# Patient Record
Sex: FEMALE | Race: WHITE | NOT HISPANIC OR LATINO | Employment: UNEMPLOYED | URBAN - METROPOLITAN AREA
[De-identification: names, ages, dates, MRNs, and addresses within clinical notes are randomized per-mention and may not be internally consistent; named-entity substitution may affect disease eponyms.]

---

## 2019-03-18 ENCOUNTER — TRANSCRIBE ORDERS (OUTPATIENT)
Dept: ADMINISTRATIVE | Facility: HOSPITAL | Age: 34
End: 2019-03-18

## 2019-03-18 DIAGNOSIS — R22.41 MASS OF RIGHT KNEE: Primary | ICD-10-CM

## 2019-03-22 ENCOUNTER — HOSPITAL ENCOUNTER (OUTPATIENT)
Dept: ULTRASOUND IMAGING | Facility: MEDICAL CENTER | Age: 34
Discharge: HOME | End: 2019-03-22
Payer: COMMERCIAL

## 2019-03-22 DIAGNOSIS — R22.41 MASS OF RIGHT KNEE: ICD-10-CM

## 2019-03-22 PROCEDURE — 76882 US LMTD JT/FCL EVL NVASC XTR: CPT

## 2019-05-15 ENCOUNTER — TRANSCRIBE ORDERS (OUTPATIENT)
Dept: ADMINISTRATIVE | Facility: HOSPITAL | Age: 34
End: 2019-05-15

## 2019-05-15 DIAGNOSIS — M25.861 CYST OF RIGHT KNEE JOINT: Primary | ICD-10-CM

## 2019-06-12 ENCOUNTER — HOSPITAL ENCOUNTER (OUTPATIENT)
Dept: MRI IMAGING | Facility: HOSPITAL | Age: 34
Discharge: HOME | End: 2019-06-12
Payer: COMMERCIAL

## 2019-06-12 DIAGNOSIS — M25.861 CYST OF RIGHT KNEE JOINT: ICD-10-CM

## 2019-06-12 PROCEDURE — 73721 MRI JNT OF LWR EXTRE W/O DYE: CPT

## 2019-12-27 ENCOUNTER — TRANSCRIBE ORDERS (OUTPATIENT)
Dept: ADMINISTRATIVE | Facility: HOSPITAL | Age: 34
End: 2019-12-27

## 2020-09-23 PROCEDURE — 87653 STREP B DNA AMP PROBE: CPT | Performed by: OBSTETRICS & GYNECOLOGY

## 2020-09-24 ENCOUNTER — LAB REQUISITION (OUTPATIENT)
Dept: LAB | Facility: HOSPITAL | Age: 35
End: 2020-09-24
Payer: COMMERCIAL

## 2020-09-24 DIAGNOSIS — O09.893 SUPERVISION OF OTHER HIGH RISK PREGNANCIES, THIRD TRIMESTER: ICD-10-CM

## 2020-09-26 LAB — GP B STREP DNA SPEC QL NAA+PROBE: ABNORMAL

## 2020-09-27 ENCOUNTER — HOSPITAL ENCOUNTER (OUTPATIENT)
Facility: HOSPITAL | Age: 35
Discharge: HOME | End: 2020-09-27
Attending: OBSTETRICS & GYNECOLOGY | Admitting: OBSTETRICS & GYNECOLOGY
Payer: COMMERCIAL

## 2020-09-27 VITALS
HEART RATE: 80 BPM | RESPIRATION RATE: 18 BRPM | DIASTOLIC BLOOD PRESSURE: 58 MMHG | SYSTOLIC BLOOD PRESSURE: 116 MMHG | TEMPERATURE: 98.5 F

## 2020-09-27 PROBLEM — Z3A.37 37 WEEKS GESTATION OF PREGNANCY: Status: ACTIVE | Noted: 2020-09-27

## 2020-09-27 PROBLEM — Z87.59 HISTORY OF DELIVERY OF MACROSOMAL INFANT: Status: ACTIVE | Noted: 2020-09-27

## 2020-09-27 PROBLEM — B95.1 POSITIVE GBS TEST: Status: ACTIVE | Noted: 2020-09-27

## 2020-09-27 PROCEDURE — 99203 OFFICE O/P NEW LOW 30 MIN: CPT

## 2020-09-27 PROCEDURE — NC001 PR NO CHARGE: Performed by: OBSTETRICS & GYNECOLOGY

## 2020-10-22 ENCOUNTER — ANESTHESIA EVENT (INPATIENT)
Dept: ANESTHESIOLOGY | Facility: HOSPITAL | Age: 35
End: 2020-10-22
Payer: COMMERCIAL

## 2020-10-22 ENCOUNTER — ANESTHESIA (INPATIENT)
Dept: ANESTHESIOLOGY | Facility: HOSPITAL | Age: 35
End: 2020-10-22
Payer: COMMERCIAL

## 2020-10-22 ENCOUNTER — HOSPITAL ENCOUNTER (INPATIENT)
Facility: HOSPITAL | Age: 35
LOS: 1 days | Discharge: HOME | End: 2020-10-23
Attending: OBSTETRICS & GYNECOLOGY | Admitting: OBSTETRICS & GYNECOLOGY
Payer: COMMERCIAL

## 2020-10-22 DIAGNOSIS — Z3A.40 40 WEEKS GESTATION OF PREGNANCY: Primary | ICD-10-CM

## 2020-10-22 PROBLEM — F41.9 ANXIETY: Status: ACTIVE | Noted: 2020-10-22

## 2020-10-22 LAB
ABO GROUP BLD: NORMAL
BASE EXCESS BLDCOV CALC-SCNC: -2.9 MMOL/L (ref 1–9)
BASOPHILS # BLD AUTO: 0.02 THOUSANDS/ΜL (ref 0–0.1)
BASOPHILS NFR BLD AUTO: 0 % (ref 0–1)
BLD GP AB SCN SERPL QL: NEGATIVE
EOSINOPHIL # BLD AUTO: 0.2 THOUSAND/ΜL (ref 0–0.61)
EOSINOPHIL NFR BLD AUTO: 2 % (ref 0–6)
ERYTHROCYTE [DISTWIDTH] IN BLOOD BY AUTOMATED COUNT: 13 % (ref 11.6–15.1)
HCO3 BLDCOV-SCNC: 21.7 MMOL/L (ref 12.2–28.6)
HCT VFR BLD AUTO: 38.5 % (ref 34.8–46.1)
HGB BLD-MCNC: 13 G/DL (ref 11.5–15.4)
IMM GRANULOCYTES # BLD AUTO: 0.03 THOUSAND/UL (ref 0–0.2)
IMM GRANULOCYTES NFR BLD AUTO: 0 % (ref 0–2)
LYMPHOCYTES # BLD AUTO: 2.71 THOUSANDS/ΜL (ref 0.6–4.47)
LYMPHOCYTES NFR BLD AUTO: 25 % (ref 14–44)
MCH RBC QN AUTO: 31.6 PG (ref 26.8–34.3)
MCHC RBC AUTO-ENTMCNC: 33.8 G/DL (ref 31.4–37.4)
MCV RBC AUTO: 94 FL (ref 82–98)
MONOCYTES # BLD AUTO: 1.09 THOUSAND/ΜL (ref 0.17–1.22)
MONOCYTES NFR BLD AUTO: 10 % (ref 4–12)
NEUTROPHILS # BLD AUTO: 6.77 THOUSANDS/ΜL (ref 1.85–7.62)
NEUTS SEG NFR BLD AUTO: 63 % (ref 43–75)
NRBC BLD AUTO-RTO: 0 /100 WBCS
OXYHGB MFR BLDCOV: 71.7 %
PCO2 BLDCOV: 37.7 MM HG (ref 27–43)
PH BLDCOV: 7.38 [PH] (ref 7.19–7.49)
PLATELET # BLD AUTO: 174 THOUSANDS/UL (ref 149–390)
PMV BLD AUTO: 10 FL (ref 8.9–12.7)
PO2 BLDCOV: 30.9 MM HG (ref 15–45)
RBC # BLD AUTO: 4.11 MILLION/UL (ref 3.81–5.12)
RH BLD: POSITIVE
RPR SER QL: NORMAL
SAO2 % BLDCOV: 15.9 ML/DL
SPECIMEN EXPIRATION DATE: NORMAL
WBC # BLD AUTO: 10.82 THOUSAND/UL (ref 4.31–10.16)

## 2020-10-22 PROCEDURE — 99213 OFFICE O/P EST LOW 20 MIN: CPT

## 2020-10-22 PROCEDURE — NC001 PR NO CHARGE: Performed by: OBSTETRICS & GYNECOLOGY

## 2020-10-22 PROCEDURE — 86900 BLOOD TYPING SEROLOGIC ABO: CPT | Performed by: OBSTETRICS & GYNECOLOGY

## 2020-10-22 PROCEDURE — 86901 BLOOD TYPING SEROLOGIC RH(D): CPT | Performed by: OBSTETRICS & GYNECOLOGY

## 2020-10-22 PROCEDURE — 82805 BLOOD GASES W/O2 SATURATION: CPT | Performed by: OBSTETRICS & GYNECOLOGY

## 2020-10-22 PROCEDURE — 86850 RBC ANTIBODY SCREEN: CPT | Performed by: OBSTETRICS & GYNECOLOGY

## 2020-10-22 PROCEDURE — 85025 COMPLETE CBC W/AUTO DIFF WBC: CPT | Performed by: OBSTETRICS & GYNECOLOGY

## 2020-10-22 PROCEDURE — 4A1HXCZ MONITORING OF PRODUCTS OF CONCEPTION, CARDIAC RATE, EXTERNAL APPROACH: ICD-10-PCS | Performed by: OBSTETRICS & GYNECOLOGY

## 2020-10-22 PROCEDURE — 86592 SYPHILIS TEST NON-TREP QUAL: CPT | Performed by: OBSTETRICS & GYNECOLOGY

## 2020-10-22 RX ORDER — LORAZEPAM 2 MG/ML
0.5 INJECTION INTRAMUSCULAR ONCE
Status: COMPLETED | OUTPATIENT
Start: 2020-10-22 | End: 2020-10-22

## 2020-10-22 RX ORDER — OXYCODONE HYDROCHLORIDE AND ACETAMINOPHEN 5; 325 MG/1; MG/1
2 TABLET ORAL EVERY 4 HOURS PRN
Status: DISCONTINUED | OUTPATIENT
Start: 2020-10-22 | End: 2020-10-24 | Stop reason: HOSPADM

## 2020-10-22 RX ORDER — OXYCODONE HYDROCHLORIDE AND ACETAMINOPHEN 5; 325 MG/1; MG/1
1 TABLET ORAL EVERY 4 HOURS PRN
Status: DISCONTINUED | OUTPATIENT
Start: 2020-10-22 | End: 2020-10-24 | Stop reason: HOSPADM

## 2020-10-22 RX ORDER — IBUPROFEN 600 MG/1
600 TABLET ORAL EVERY 6 HOURS PRN
Status: DISCONTINUED | OUTPATIENT
Start: 2020-10-22 | End: 2020-10-24 | Stop reason: HOSPADM

## 2020-10-22 RX ORDER — SODIUM CHLORIDE, SODIUM LACTATE, POTASSIUM CHLORIDE, CALCIUM CHLORIDE 600; 310; 30; 20 MG/100ML; MG/100ML; MG/100ML; MG/100ML
125 INJECTION, SOLUTION INTRAVENOUS CONTINUOUS
Status: DISCONTINUED | OUTPATIENT
Start: 2020-10-22 | End: 2020-10-22

## 2020-10-22 RX ORDER — OXYTOCIN/RINGER'S LACTATE 30/500 ML
250 PLASTIC BAG, INJECTION (ML) INTRAVENOUS CONTINUOUS
Status: DISCONTINUED | OUTPATIENT
Start: 2020-10-22 | End: 2020-10-24 | Stop reason: HOSPADM

## 2020-10-22 RX ORDER — OXYTOCIN/RINGER'S LACTATE 30/500 ML
PLASTIC BAG, INJECTION (ML) INTRAVENOUS
Status: DISPENSED
Start: 2020-10-22 | End: 2020-10-22

## 2020-10-22 RX ORDER — CALCIUM CARBONATE 200(500)MG
1000 TABLET,CHEWABLE ORAL DAILY PRN
Status: DISCONTINUED | OUTPATIENT
Start: 2020-10-22 | End: 2020-10-24 | Stop reason: HOSPADM

## 2020-10-22 RX ORDER — ACETAMINOPHEN 325 MG/1
650 TABLET ORAL EVERY 6 HOURS PRN
Status: DISCONTINUED | OUTPATIENT
Start: 2020-10-22 | End: 2020-10-24 | Stop reason: HOSPADM

## 2020-10-22 RX ORDER — ONDANSETRON 2 MG/ML
4 INJECTION INTRAMUSCULAR; INTRAVENOUS EVERY 8 HOURS PRN
Status: DISCONTINUED | OUTPATIENT
Start: 2020-10-22 | End: 2020-10-24 | Stop reason: HOSPADM

## 2020-10-22 RX ORDER — DIAPER,BRIEF,INFANT-TODD,DISP
1 EACH MISCELLANEOUS 4 TIMES DAILY PRN
Status: DISCONTINUED | OUTPATIENT
Start: 2020-10-22 | End: 2020-10-24 | Stop reason: HOSPADM

## 2020-10-22 RX ORDER — DIPHENHYDRAMINE HCL 25 MG
25 TABLET ORAL EVERY 6 HOURS PRN
Status: DISCONTINUED | OUTPATIENT
Start: 2020-10-22 | End: 2020-10-24 | Stop reason: HOSPADM

## 2020-10-22 RX ORDER — DOCUSATE SODIUM 100 MG/1
100 CAPSULE, LIQUID FILLED ORAL 2 TIMES DAILY
Status: DISCONTINUED | OUTPATIENT
Start: 2020-10-22 | End: 2020-10-24 | Stop reason: HOSPADM

## 2020-10-22 RX ORDER — ONDANSETRON 2 MG/ML
4 INJECTION INTRAMUSCULAR; INTRAVENOUS EVERY 6 HOURS PRN
Status: DISCONTINUED | OUTPATIENT
Start: 2020-10-22 | End: 2020-10-22

## 2020-10-22 RX ADMIN — SERTRALINE HYDROCHLORIDE 50 MG: 50 TABLET, FILM COATED ORAL at 21:58

## 2020-10-22 RX ADMIN — WITCH HAZEL 1 PAD: 500 SOLUTION RECTAL; TOPICAL at 08:39

## 2020-10-22 RX ADMIN — SODIUM CHLORIDE 5 MILLION UNITS: 0.9 INJECTION, SOLUTION INTRAVENOUS at 03:02

## 2020-10-22 RX ADMIN — DOCUSATE SODIUM 100 MG: 100 CAPSULE, LIQUID FILLED ORAL at 17:46

## 2020-10-22 RX ADMIN — IBUPROFEN 600 MG: 600 TABLET, FILM COATED ORAL at 17:46

## 2020-10-22 RX ADMIN — HYDROCORTISONE 1 APPLICATION: 1 CREAM TOPICAL at 08:39

## 2020-10-22 RX ADMIN — BENZOCAINE AND LEVOMENTHOL: 200; 5 SPRAY TOPICAL at 08:39

## 2020-10-22 RX ADMIN — LORAZEPAM 0.5 MG: 2 INJECTION INTRAMUSCULAR; INTRAVENOUS at 05:10

## 2020-10-22 RX ADMIN — DOCUSATE SODIUM 100 MG: 100 CAPSULE, LIQUID FILLED ORAL at 08:39

## 2020-10-23 VITALS
DIASTOLIC BLOOD PRESSURE: 66 MMHG | HEIGHT: 68 IN | BODY MASS INDEX: 42.44 KG/M2 | WEIGHT: 280 LBS | TEMPERATURE: 98.1 F | RESPIRATION RATE: 17 BRPM | SYSTOLIC BLOOD PRESSURE: 102 MMHG | HEART RATE: 66 BPM

## 2020-10-23 PROCEDURE — NC001 PR NO CHARGE: Performed by: OBSTETRICS & GYNECOLOGY

## 2020-10-23 RX ORDER — ACETAMINOPHEN 325 MG/1
650 TABLET ORAL EVERY 6 HOURS PRN
Refills: 0
Start: 2020-10-23 | End: 2022-04-08 | Stop reason: ALTCHOICE

## 2020-10-23 RX ORDER — IBUPROFEN 200 MG
600 TABLET ORAL EVERY 6 HOURS PRN
Start: 2020-10-23 | End: 2022-04-08 | Stop reason: ALTCHOICE

## 2020-10-23 RX ADMIN — DOCUSATE SODIUM 100 MG: 100 CAPSULE, LIQUID FILLED ORAL at 13:01

## 2020-10-23 RX ADMIN — IBUPROFEN 600 MG: 600 TABLET, FILM COATED ORAL at 02:36

## 2020-10-30 LAB — PLACENTA IN STORAGE: NORMAL

## 2021-10-18 ENCOUNTER — OFFICE VISIT (OUTPATIENT)
Dept: URGENT CARE | Facility: CLINIC | Age: 36
End: 2021-10-18
Payer: COMMERCIAL

## 2021-10-18 VITALS
OXYGEN SATURATION: 98 % | HEART RATE: 82 BPM | SYSTOLIC BLOOD PRESSURE: 127 MMHG | TEMPERATURE: 97.8 F | RESPIRATION RATE: 20 BRPM | DIASTOLIC BLOOD PRESSURE: 77 MMHG

## 2021-10-18 DIAGNOSIS — J06.9 ACUTE URI: ICD-10-CM

## 2021-10-18 DIAGNOSIS — J02.9 SORE THROAT: Primary | ICD-10-CM

## 2021-10-18 LAB — S PYO AG THROAT QL: NEGATIVE

## 2021-10-18 PROCEDURE — 87070 CULTURE OTHR SPECIMN AEROBIC: CPT | Performed by: NURSE PRACTITIONER

## 2021-10-18 PROCEDURE — U0005 INFEC AGEN DETEC AMPLI PROBE: HCPCS | Performed by: NURSE PRACTITIONER

## 2021-10-18 PROCEDURE — U0003 INFECTIOUS AGENT DETECTION BY NUCLEIC ACID (DNA OR RNA); SEVERE ACUTE RESPIRATORY SYNDROME CORONAVIRUS 2 (SARS-COV-2) (CORONAVIRUS DISEASE [COVID-19]), AMPLIFIED PROBE TECHNIQUE, MAKING USE OF HIGH THROUGHPUT TECHNOLOGIES AS DESCRIBED BY CMS-2020-01-R: HCPCS | Performed by: NURSE PRACTITIONER

## 2021-10-18 PROCEDURE — 99213 OFFICE O/P EST LOW 20 MIN: CPT | Performed by: NURSE PRACTITIONER

## 2021-10-18 PROCEDURE — 87147 CULTURE TYPE IMMUNOLOGIC: CPT | Performed by: NURSE PRACTITIONER

## 2021-10-20 LAB — SARS-COV-2 RNA RESP QL NAA+PROBE: POSITIVE

## 2021-10-22 LAB — BACTERIA THROAT CULT: ABNORMAL

## 2022-04-08 ENCOUNTER — OFFICE VISIT (OUTPATIENT)
Dept: FAMILY MEDICINE CLINIC | Facility: CLINIC | Age: 37
End: 2022-04-08
Payer: COMMERCIAL

## 2022-04-08 VITALS
WEIGHT: 262.2 LBS | HEART RATE: 70 BPM | SYSTOLIC BLOOD PRESSURE: 120 MMHG | BODY MASS INDEX: 38.83 KG/M2 | HEIGHT: 69 IN | DIASTOLIC BLOOD PRESSURE: 78 MMHG | RESPIRATION RATE: 15 BRPM | OXYGEN SATURATION: 98 %

## 2022-04-08 DIAGNOSIS — F41.9 ANXIETY AND DEPRESSION: ICD-10-CM

## 2022-04-08 DIAGNOSIS — Z00.00 ANNUAL PHYSICAL EXAM: Primary | ICD-10-CM

## 2022-04-08 DIAGNOSIS — R53.83 OTHER FATIGUE: ICD-10-CM

## 2022-04-08 DIAGNOSIS — F32.A ANXIETY AND DEPRESSION: ICD-10-CM

## 2022-04-08 PROBLEM — Z3A.40 40 WEEKS GESTATION OF PREGNANCY: Status: RESOLVED | Noted: 2020-09-27 | Resolved: 2022-04-08

## 2022-04-08 PROBLEM — D48.1 NEOPLASM OF UNCERTAIN BEHAVIOR OF CONNECTIVE AND OTHER SOFT TISSUE: Status: ACTIVE | Noted: 2019-09-20

## 2022-04-08 PROBLEM — D48.19 NEOPLASM OF UNCERTAIN BEHAVIOR OF CONNECTIVE AND OTHER SOFT TISSUE: Status: ACTIVE | Noted: 2019-09-20

## 2022-04-08 PROBLEM — Z87.59 HISTORY OF DELIVERY OF MACROSOMAL INFANT: Status: RESOLVED | Noted: 2020-09-27 | Resolved: 2022-04-08

## 2022-04-08 PROCEDURE — 1036F TOBACCO NON-USER: CPT | Performed by: NURSE PRACTITIONER

## 2022-04-08 PROCEDURE — 99385 PREV VISIT NEW AGE 18-39: CPT | Performed by: NURSE PRACTITIONER

## 2022-04-08 PROCEDURE — 3008F BODY MASS INDEX DOCD: CPT | Performed by: NURSE PRACTITIONER

## 2022-04-08 PROCEDURE — 99204 OFFICE O/P NEW MOD 45 MIN: CPT | Performed by: NURSE PRACTITIONER

## 2022-04-08 PROCEDURE — 3725F SCREEN DEPRESSION PERFORMED: CPT | Performed by: NURSE PRACTITIONER

## 2022-04-08 RX ORDER — SERTRALINE HYDROCHLORIDE 100 MG/1
100 TABLET, FILM COATED ORAL DAILY
Qty: 90 TABLET | Refills: 3 | Status: SHIPPED | OUTPATIENT
Start: 2022-04-08

## 2022-04-08 NOTE — PATIENT INSTRUCTIONS
0 = independent Lower Back Exercises   WHAT YOU NEED TO KNOW:   Lower back exercises help heal and strengthen your back muscles to prevent another injury  Ask your healthcare provider if you need to see a physical therapist for more advanced exercises  DISCHARGE INSTRUCTIONS:   Return to the emergency department if:   · You have severe pain that prevents you from moving  Contact your healthcare provider if:   · Your pain becomes worse  · You have new pain  · You have questions or concerns about your condition or care  Do lower back exercises safely:   · Do the exercises on a mat or firm surface  (not on a bed) to support your spine and prevent low back pain  · Move slowly and smoothly  Avoid fast or jerky motions  · Breathe normally  Do not hold your breath  · Stop if you feel pain  It is normal to feel some discomfort at first  Regular exercise will help decrease your discomfort over time  Lower back exercises: Your healthcare provider may recommend that you do back exercises 10 to 30 minutes each day  He may also recommend that you do exercises 1 to 3 times each day  Ask your healthcare provider which exercises are best for you and how often to do them  · Ankle pumps:  Lie on your back  Move your foot up (with your toes pointing toward your head)  Then, move your foot down (with your toes pointing away from you)  Repeat this exercise 10 times on each side  · Heel slides:  Lie on your back  Slowly bend one leg and then straighten it  Next, bend the other leg and then straighten it  Repeat 10 times on each side  · Pelvic tilt:  Lie on your back with your knees bent and feet flat on the floor  Place your arms in a relaxed position beside your body  Tighten the muscles of your abdomen and flatten your back against the floor  Hold for 5 seconds  Repeat 5 times  · Back stretch:  Lie on your back with your hands behind your head   Bend your knees and turn the lower half of your body to one side  Hold this position for 10 seconds  Repeat 3 times on each side  · Straight leg raises:  Lie on your back with one leg straight  Bend the other knee  Tighten your abdomen and then slowly lift the straight leg up about 6 to 12 inches off the floor  Hold for 1 to 5 seconds  Lower your leg slowly  Repeat 10 times on each leg  · Knee-to-chest:  Lie on your back with your knees bent and feet flat on the floor  Pull one of your knees toward your chest and hold it there for 5 seconds  Return your leg to the starting position  Lift the other knee toward your chest and hold for 5 seconds  Do this 5 times on each side  · Cat and camel:  Place your hands and knees on the floor  Arch your back upward toward the ceiling and lower your head  Round out your spine as much as you can  Hold for 5 seconds  Lift your head upward and push your chest downward toward the floor  Hold for 5 seconds  Do 3 sets or as directed  · Wall squats:  Stand with your back against a wall  Tighten the muscles of your abdomen  Slowly lower your body until your knees are bent at a 45 degree angle  Hold this position for 5 seconds  Slowly move back up to a standing position  Repeat 10 times  · Curl up:  Lie on your back with your knees bent and feet flat on the floor  Place your hands, palms down, underneath the curve in your lower back  Next, with your elbows on the floor, lift your shoulders and chest 2 to 3 inches  Keep your head in line with your shoulders  Hold this position for 5 seconds  When you can do this exercise without pain for 10 to 15 seconds, you may add a rotation  While your shoulders and chest are lifted off the ground, turn slightly to the left and hold  Repeat on the other side  · Bird dog:  Place your hands and knees on the floor  Keep your wrists directly below your shoulders and your knees directly below your hips  Pull your belly button in toward your spine   Do not flatten or arch your back  Tighten your abdominal muscles  Raise one arm straight out so that it is aligned with your head  Next, raise the leg opposite your arm  Hold this position for 15 seconds  Lower your arm and leg slowly and change sides  Do 5 sets  © Copyright [employer] 2022 Information is for End User's use only and may not be sold, redistributed or otherwise used for commercial purposes  All illustrations and images included in CareNotes® are the copyrighted property of A D A M , Inc  or Shayla Gaytan   The above information is an  only  It is not intended as medical advice for individual conditions or treatments  Talk to your doctor, nurse or pharmacist before following any medical regimen to see if it is safe and effective for you  Wellness Visit for Adults   AMBULATORY CARE:   A wellness visit  is when you see your healthcare provider to get screened for health problems  Your healthcare provider will also give you advice on how to stay healthy  Write down your questions so you remember to ask them  Ask your healthcare provider how often you should have a wellness visit  What happens at a wellness visit:  Your healthcare provider will ask about your health, and your family history of health problems  This includes high blood pressure, heart disease, and cancer  He or she will ask if you have symptoms that concern you, if you smoke, and about your mood  You may also be asked about your intake of medicines, supplements, food, and alcohol  Any of the following may be done:  · Your weight  will be checked  Your height may also be checked so your body mass index (BMI) can be calculated  Your BMI shows if you are at a healthy weight  · Your blood pressure  and heart rate will be checked  Your temperature may also be checked  · Blood and urine tests  may be done  Blood tests may be done to check your cholesterol levels   Abnormal cholesterol levels increase your risk for heart disease and stroke  You may also need a blood or urine test to check for diabetes if you are at increased risk  Urine tests may be done to look for signs of an infection or kidney disease  · A physical exam  includes checking your heartbeat and lungs with a stethoscope  Your healthcare provider may also check your skin to look for sun damage  · Screening tests  may be recommended  A screening test is done to check for diseases that may not cause symptoms  The screening tests you may need depend on your age, gender, family history, and lifestyle habits  For example, colorectal screening may be recommended if you are 48years old or older  Screening tests you need if you are a woman:   · A Pap smear  is used to screen for cervical cancer  Pap smears are usually done every 3 to 5 years depending on your age  You may need them more often if you have had abnormal Pap smear test results in the past  Ask your healthcare provider how often you should have a Pap smear  · A mammogram  is an x-ray of your breasts to screen for breast cancer  Experts recommend mammograms every 2 years starting at age 48 years  You may need a mammogram at age 52 years or younger if you have an increased risk for breast cancer  Talk to your healthcare provider about when you should start having mammograms and how often you need them  Vaccines you may need:   · Get an influenza vaccine  every year  The influenza vaccine protects you from the flu  Several types of viruses cause the flu  The viruses change over time, so new vaccines are made each year  · Get a tetanus-diphtheria (Td) booster vaccine  every 10 years  This vaccine protects you against tetanus and diphtheria  Tetanus is a severe infection that may cause painful muscle spasms and lockjaw  Diphtheria is a severe bacterial infection that causes a thick covering in the back of your mouth and throat      · Get a human papillomavirus (HPV) vaccine  if you are female and aged 23 to 32 or male 23 to 24 and never received it  This vaccine protects you from HPV infection  HPV is the most common infection spread by sexual contact  HPV may also cause vaginal, penile, and anal cancers  · Get a pneumococcal vaccine  if you are aged 72 years or older  The pneumococcal vaccine is an injection given to protect you from pneumococcal disease  Pneumococcal disease is an infection caused by pneumococcal bacteria  The infection may cause pneumonia, meningitis, or an ear infection  · Get a shingles vaccine  if you are 60 or older, even if you have had shingles before  The shingles vaccine is an injection to protect you from the varicella-zoster virus  This is the same virus that causes chickenpox  Shingles is a painful rash that develops in people who had chickenpox or have been exposed to the virus  How to eat healthy:  My Plate is a model for planning healthy meals  It shows the types and amounts of foods that should go on your plate  Fruits and vegetables make up about half of your plate, and grains and protein make up the other half  A serving of dairy is included on the side of your plate  The amount of calories and serving sizes you need depends on your age, gender, weight, and height  Examples of healthy foods are listed below:  · Eat a variety of vegetables  such as dark green, red, and orange vegetables  You can also include canned vegetables low in sodium (salt) and frozen vegetables without added butter or sauces  · Eat a variety of fresh fruits , canned fruit in 100% juice, frozen fruit, and dried fruit  · Include whole grains  At least half of the grains you eat should be whole grains  Examples include whole-wheat bread, wheat pasta, brown rice, and whole-grain cereals such as oatmeal     · Eat a variety of protein foods such as seafood (fish and shellfish), lean meat, and poultry without skin (turkey and chicken)   Examples of lean meats include pork leg, shoulder, or tenderloin, and beef round, sirloin, tenderloin, and extra lean ground beef  Other protein foods include eggs and egg substitutes, beans, peas, soy products, nuts, and seeds  · Choose low-fat dairy products such as skim or 1% milk or low-fat yogurt, cheese, and cottage cheese  · Limit unhealthy fats  such as butter, hard margarine, and shortening  Exercise:  Exercise at least 30 minutes per day on most days of the week  Some examples of exercise include walking, biking, dancing, and swimming  You can also fit in more physical activity by taking the stairs instead of the elevator or parking farther away from stores  Include muscle strengthening activities 2 days each week  Regular exercise provides many health benefits  It helps you manage your weight, and decreases your risk for type 2 diabetes, heart disease, stroke, and high blood pressure  Exercise can also help improve your mood  Ask your healthcare provider about the best exercise plan for you  General health and safety guidelines:   · Do not smoke  Nicotine and other chemicals in cigarettes and cigars can cause lung damage  Ask your healthcare provider for information if you currently smoke and need help to quit  E-cigarettes or smokeless tobacco still contain nicotine  Talk to your healthcare provider before you use these products  · Limit alcohol  A drink of alcohol is 12 ounces of beer, 5 ounces of wine, or 1½ ounces of liquor  · Lose weight, if needed  Being overweight increases your risk of certain health conditions  These include heart disease, high blood pressure, type 2 diabetes, and certain types of cancer  · Protect your skin  Do not sunbathe or use tanning beds  Use sunscreen with a SPF 15 or higher  Apply sunscreen at least 15 minutes before you go outside  Reapply sunscreen every 2 hours  Wear protective clothing, hats, and sunglasses when you are outside  · Drive safely  Always wear your seatbelt   Make sure everyone in your car wears a seatbelt  A seatbelt can save your life if you are in an accident  Do not use your cell phone when you are driving  This could distract you and cause an accident  Pull over if you need to make a call or send a text message  · Practice safe sex  Use latex condoms if are sexually active and have more than one partner  Your healthcare provider may recommend screening tests for sexually transmitted infections (STIs)  · Wear helmets, lifejackets, and protective gear  Always wear a helmet when you ride a bike or motorcycle, go skiing, or play sports that could cause a head injury  Wear protective equipment when you play sports  Wear a lifejacket when you are on a boat or doing water sports  © Copyright [employer] 2022 Information is for End User's use only and may not be sold, redistributed or otherwise used for commercial purposes  All illustrations and images included in CareNotes® are the copyrighted property of A D A M , Inc  or [facility] Steve Gaytan   The above information is an  only  It is not intended as medical advice for individual conditions or treatments  Talk to your doctor, nurse or pharmacist before following any medical regimen to see if it is safe and effective for you

## 2022-04-08 NOTE — PROGRESS NOTES
Assessment/Plan:     Diagnoses and all orders for this visit:    Anxiety and depression  -     sertraline (ZOLOFT) 100 mg tablet; Take 1 tablet (100 mg total) by mouth daily    Stable at this time per pt  Continue Zoloft  Patient is encouraged to call our office for any questions/concerns, persistent or worsening symptoms  Patient states they understand and agree with treatment plan  Other fatigue  -     CBC and differential; Future  -     Comprehensive metabolic panel; Future  -     TSH, 3rd generation with Free T4 reflex; Future  -     Vitamin D 25 hydroxy; Future  -     Lyme Total Antibody Profile with reflex to WB; Future    Etiology unclear at this time  Baseline labs to further evaluate  Hx of covid fall 2021  We will contact her with results once available  Patient is encouraged to call our office for any questions/concerns, persistent or worsening symptoms  Patient states they understand and agree with treatment plan  BMI 39 0-39 9,adult  -     Ambulatory Referral to Weight Management; Future      We discussed appropriate lifestyle recommendations & goal BMI range  Pt was educated on appropriate diet and exercise modifications  Weight mgmt referral placed  Pt to f/u PRN  F/u pending results  Subjective:      Patient ID: Lito Gonzales is a 39 y o  female  New patient presents to establish care  She notes a hx of depression which she is on Zoloft 100 mg for  She also notes that she was seeing a therapist prior, but stopped in December d/t cost   Patient not interested in starting again for the time being  She does have concerns over fatigue and inability to lose weight  She notes she had covid fall of 2021  The fatigue is every day and she notes she feels tired all the time  Patient also has an 21 month old  Additionally, she would like to see weight mgmt        The following portions of the patient's history were reviewed and updated as appropriate: allergies, current medications, past family history, past medical history, past social history, past surgical history and problem list     Review of Systems   Constitutional: Positive for fatigue  Negative for chills and fever  HENT: Negative  Respiratory: Negative  Negative for cough and shortness of breath  Cardiovascular: Negative  Negative for chest pain  Gastrointestinal: Negative  Genitourinary: Negative  Musculoskeletal: Negative  Negative for myalgias  Neurological: Negative  Psychiatric/Behavioral: Positive for dysphoric mood  The patient is nervous/anxious  Objective:      /78   Pulse 70   Resp 15   Ht 5' 8 5" (1 74 m)   Wt 119 kg (262 lb 3 2 oz)   SpO2 98%   BMI 39 29 kg/m²          Physical Exam  Vitals reviewed  Constitutional:       Appearance: Normal appearance  HENT:      Head: Normocephalic  Cardiovascular:      Rate and Rhythm: Normal rate and regular rhythm  Pulses: Normal pulses  Heart sounds: Normal heart sounds  No murmur heard  Pulmonary:      Effort: Pulmonary effort is normal  No respiratory distress  Breath sounds: Normal breath sounds  No wheezing  Abdominal:      General: There is no distension  Palpations: Abdomen is soft  There is no mass  Tenderness: There is no abdominal tenderness  There is no guarding or rebound  Hernia: No hernia is present  Musculoskeletal:         General: Normal range of motion  Skin:     General: Skin is warm and dry  Neurological:      Mental Status: She is alert and oriented to person, place, and time  Mental status is at baseline  Psychiatric:         Mood and Affect: Mood normal          Behavior: Behavior normal          Thought Content:  Thought content normal          Judgment: Judgment normal

## 2022-04-08 NOTE — PROGRESS NOTES
ADULT ANNUAL PHYSICAL  [facility]    NAME: Karine Castorena  AGE: 39 y o  SEX: female  : 1985     DATE: 2022     Assessment and Plan:  1  Awaiting records for immunizations  2  PAP through [facility]  3  F/u in 1 year for annual physical or sooner PRN  Problem List Items Addressed This Visit     None      Visit Diagnoses     Annual physical exam    -  Primary    Anxiety and depression        Relevant Medications    sertraline (ZOLOFT) 100 mg tablet    Other fatigue        Relevant Orders    CBC and differential    Comprehensive metabolic panel    TSH, 3rd generation with Free T4 reflex    Vitamin D 25 hydroxy    Lyme Total Antibody Profile with reflex to WB    BMI 39 0-39 9,adult        Relevant Orders    Ambulatory Referral to Weight Management          Immunizations and preventive care screenings were discussed with patient today  Appropriate education was printed on patient's after visit summary  Counseling:  Alcohol/drug use: discussed moderation in alcohol intake, the recommendations for healthy alcohol use, and avoidance of illicit drug use  Dental Health: discussed importance of regular tooth brushing, flossing, and dental visits  Injury prevention: discussed safety/seat belts, safety helmets, smoke detectors, carbon dioxide detectors, and smoking near bedding or upholstery  Sexual health: discussed sexually transmitted diseases, partner selection, use of condoms, avoidance of unintended pregnancy, and contraceptive alternatives  · Exercise: the importance of regular exercise/physical activity was discussed  Recommend exercise 3-5 times per week for at least 30 minutes  BMI Counseling: Body mass index is 39 29 kg/m²   The BMI is above normal  Nutrition recommendations include decreasing portion sizes, encouraging healthy choices of fruits and vegetables, decreasing fast food intake, consuming healthier snacks, limiting drinks that contain sugar, moderation in carbohydrate intake, increasing intake of lean protein, reducing intake of saturated and trans fat and reducing intake of cholesterol  Exercise recommendations include moderate physical activity 150 minutes/week  No pharmacotherapy was ordered  Rationale for BMI follow-up plan is due to patient being overweight or obese  Depression Screening and Follow-up Plan: Patient was screened for depression during today's encounter  They screened negative with a PHQ-2 score of 0  Return in about 1 year (around 4/8/2023) for Annual physical      Chief Complaint:     Chief Complaint   Patient presents with   Colin Establish Care    Discuss Weight loss      History of Present Illness:     Adult Annual Physical   Patient here for a comprehensive physical exam  The patient reports no problems  Diet and Physical Activity  · Diet/Nutrition: well balanced diet, consuming 3-5 servings of fruits/vegetables daily and adequate fiber intake  · Exercise: no formal exercise  Depression Screening  PHQ-2/9 Depression Screening    Little interest or pleasure in doing things: 0 - not at all  Feeling down, depressed, or hopeless: 0 - not at all  PHQ-2 Score: 0  PHQ-2 Interpretation: Negative depression screen       General Health  · Sleep: sleeps well and gets 7-8 hours of sleep on average  · Hearing: normal - bilateral   · Vision: no vision problems  · Dental: regular dental visits  Review of Systems:     Review of Systems   Constitutional: Positive for fatigue  Negative for chills  HENT: Negative  Respiratory: Negative  Negative for cough and shortness of breath  Cardiovascular: Negative  Negative for chest pain  Gastrointestinal: Negative  Genitourinary: Negative  Musculoskeletal: Negative  Negative for myalgias  Neurological: Negative  Psychiatric/Behavioral: Positive for dysphoric mood  The patient is nervous/anxious  Past Medical History:     History reviewed  No pertinent past medical history  Past Surgical History:     Past Surgical History:   Procedure Laterality Date    OOPHORECTOMY        Social History:     Social History     Socioeconomic History    Marital status: /Civil Union     Spouse name: None    Number of children: None    Years of education: None    Highest education level: None   Occupational History    None   Tobacco Use    Smoking status: Never Smoker    Smokeless tobacco: Never Used   Vaping Use    Vaping Use: Never used   Substance and Sexual Activity    Alcohol use: Yes     Comment: socially    Drug use: Never    Sexual activity: None   Other Topics Concern    None   Social History Narrative    None     Social Determinants of Health     Financial Resource Strain: Not on file   Food Insecurity: Not on file   Transportation Needs: Not on file   Physical Activity: Not on file   Stress: Not on file   Social Connections: Not on file   Intimate Partner Violence: Not on file   Housing Stability: Not on file      Family History:     Family History   Problem Relation Age of Onset    Cancer Mother         lung cancer, smoker    Heart attack Father         in 2018    Alcohol abuse Neg Hx     Substance Abuse Neg Hx     Mental illness Neg Hx       Current Medications:     Current Outpatient Medications   Medication Sig Dispense Refill    sertraline (ZOLOFT) 100 mg tablet Take 1 tablet (100 mg total) by mouth daily 90 tablet 3     No current facility-administered medications for this visit  Allergies:     No Known Allergies   Physical Exam:     /78   Pulse 70   Resp 15   Ht 5' 8 5" (1 74 m)   Wt 119 kg (262 lb 3 2 oz)   SpO2 98%   BMI 39 29 kg/m²     Physical Exam  Vitals and nursing note reviewed  Constitutional:       General: She is not in acute distress  Appearance: Normal appearance  She is well-developed  She is not ill-appearing     HENT:      Head: Normocephalic and atraumatic  Eyes:      Conjunctiva/sclera: Conjunctivae normal    Neck:      Vascular: No carotid bruit  Cardiovascular:      Rate and Rhythm: Normal rate and regular rhythm  Pulses: Normal pulses  Heart sounds: Normal heart sounds  No murmur heard  Pulmonary:      Effort: Pulmonary effort is normal  No respiratory distress  Breath sounds: Normal breath sounds  No wheezing  Abdominal:      General: There is no distension  Palpations: Abdomen is soft  There is no mass  Tenderness: There is no abdominal tenderness  There is no guarding or rebound  Hernia: No hernia is present  Musculoskeletal:         General: Normal range of motion  Cervical back: Normal range of motion and neck supple  Right lower leg: No edema  Left lower leg: No edema  Skin:     General: Skin is warm and dry  Capillary Refill: Capillary refill takes less than 2 seconds  Neurological:      Mental Status: She is alert and oriented to person, place, and time  Mental status is at baseline  Motor: No weakness  Gait: Gait normal    Psychiatric:         Mood and Affect: Mood normal          Behavior: Behavior normal          Thought Content:  Thought content normal          Judgment: Judgment normal           Stephanie Linares, [address] [facility]

## 2022-05-12 ENCOUNTER — CONSULT (OUTPATIENT)
Dept: BARIATRICS | Facility: CLINIC | Age: 37
End: 2022-05-12
Payer: COMMERCIAL

## 2022-05-12 VITALS
DIASTOLIC BLOOD PRESSURE: 74 MMHG | BODY MASS INDEX: 39.22 KG/M2 | HEART RATE: 98 BPM | WEIGHT: 258.8 LBS | SYSTOLIC BLOOD PRESSURE: 114 MMHG | HEIGHT: 68 IN | TEMPERATURE: 98 F

## 2022-05-12 DIAGNOSIS — Z91.89 AT RISK FOR SLEEP APNEA: ICD-10-CM

## 2022-05-12 DIAGNOSIS — R73.9 HYPERGLYCEMIA: ICD-10-CM

## 2022-05-12 DIAGNOSIS — F41.9 ANXIETY: ICD-10-CM

## 2022-05-12 DIAGNOSIS — E66.9 OBESITY, CLASS II, BMI 35-39.9: Primary | ICD-10-CM

## 2022-05-12 PROCEDURE — 3008F BODY MASS INDEX DOCD: CPT | Performed by: FAMILY MEDICINE

## 2022-05-12 PROCEDURE — 1036F TOBACCO NON-USER: CPT | Performed by: FAMILY MEDICINE

## 2022-05-12 PROCEDURE — 99203 OFFICE O/P NEW LOW 30 MIN: CPT | Performed by: FAMILY MEDICINE

## 2022-05-12 NOTE — PATIENT INSTRUCTIONS
Goals:  Do not skip any meals! Food log (ie ) www myfitnesspal com,sparkpeople  com,loseit com,calorieking  com,etc  baritastic (use skinnytaste  com, dietdoctor  com or smartphone abner [employer] for recipes)  No sugary beverages  At least 64oz of water daily  Increase physical activity by 10 minutes daily   Gradually increase physical activity to a goal of 5 days per week for 30 minutes of MODERATE intensity PLUS 2 days per week of FULL BODY resistance training (use smartphone apps Beeminder, Home Workout, etc )

## 2022-05-12 NOTE — PROGRESS NOTES
Assessment/Plan:  Kamila Fuchs was seen today for consult  Diagnoses and all orders for this visit:    Obesity, Class II, BMI 35-39 9  BMI 39 0-39 9,adult  -     Ambulatory Referral to Weight Management  Hyperglycemia  -     Insulin, fasting; Future  -     Hemoglobin A1C; Future  Patient will be pursuing healthy core  I discussed with her that her diet is rather heavy in carbohydrates  I informed her that when she meets with the dietitian her carbohydrate intake will likely be lowered and her pro take intake will be increased  Patient also has hyperglycemia on previous labs thus confirming that her diet is too high in carbohydrates  Check additional labs to monitor for insulin resistance and diabetes  May benefit from anti obesity medication in the future following healthy core program     Anxiety  Currently on sertraline  Can consider adding Wellbutrin in the future to help with anxiety and with weight loss  At risk for sleep apnea  -     Ambulatory referral to Sleep Medicine; Future  Referral to Sleep Medicine to assess for sleep apnea  Patient did express some interest in having weight loss surgery however at this time she does not qualify but if she were to have sleep apnea then she would qualify for surgery  - Discussed options of [facility]-Intensive Lifestyle Intervention Program, Very Low Calorie Diet-VLCD and Conservative Program and the role of weight loss medications  - Explained the importance of making lifestyle changes first before starting any anti-obesity medications  Patient should demonstrate lifestyle changes first before anti-obesity medication can be initiated  - Patient is interested in pursuing [facility]-Intensive Lifestyle Intervention Program  - Initial weight loss goal of 5-10% weight loss for improved health  - Weight loss can improve patient's co-morbid conditions and/or prevent weight-related complications  Goals:  Do not skip any meals!   Food log (ie ) www myfitnesspal com,sparkpeople  com,loseit com,calorieking  com,etc  baritastic (use skinnytaste  com, dietdoctor  com or smartphone abner [employer] for recipes)  No sugary beverages  At least 64oz of water daily  Increase physical activity by 10 minutes daily  Gradually increase physical activity to a goal of 5 days per week for 30 minutes of MODERATE intensity PLUS 2 days per week of FULL BODY resistance training (use smartphone apps RelateIQ, Home Workout, etc )    Total time spent: 30 min, with >50% face-to-face time spent counseling patient on nonsurgical interventions for the treatment of excess weight  Discussed in detail nonsurgical options including intensive lifestyle intervention program, very low-calorie diet program and conservative program   Discussed the role of weight loss medications  Counseled patient on diet behavior and exercise modification for weight loss  Follow up in approximately One month and three months with dietitian and physician respectively  Subjective:   Chief Complaint   Patient presents with    Consult     MWM -Consult, patient goal wt is 180lb, S/B 4/8  Patient ID: Denisse Beth  is a 39 y o  female with excess weight/obesity here to pursue weight management  Previous notes and records have been reviewed  Past Medical History:   Diagnosis Date    Anxiety     Depression      Past Surgical History:   Procedure Laterality Date    OOPHORECTOMY         HPI:  Patient presents for medical weight management consultation  She does have some interest in weight loss surgery but would like to try healthy core to help with weight loss initially  Wt Readings from Last 20 Encounters:   05/12/22 117 kg (258 lb 12 8 oz)   04/08/22 119 kg (262 lb 3 2 oz)   10/22/20 127 kg (280 lb)     Obesity/Excess Weight:  Severity: Moderate  Onset:  lifelong    Modifiers: Diet and Exercise, Commercial Weight Loss Programs-ie   Weight Watchers, Marleta Nova, Nutrisystem, etc  and Over the Counter Weight Loss Supplements  Contributing factors: Poor Food Choices, Insufficient Physical Activity and Pregnancy  Associated symptoms: fatigue, increased joint pain, body image issues and clothes do not fit    B- cereal w/ whole milk and banana  S- no  L- fast food 2-3x/wk or sandwich  S- crackers  D- protein, veggie and carb  S- ice cream sometimes    Hydration: 32oz water daily, 24oz coffee daily w/ sweetened creamer, 2 can seltzer water  Alcohol: 1 glass of wine 2-3x/wk  Smoking: no  Exercise: no  Occupation: stay at home mom  Sleep: 6hrs  STOP ban/8    The following portions of the patient's history were reviewed and updated as appropriate: allergies, current medications, past family history, past medical history, past social history, past surgical history, and problem list     Review of Systems   Constitutional: Negative for fever  Respiratory: Negative for shortness of breath  Cardiovascular: Negative for chest pain  Gastrointestinal: Negative for abdominal pain and blood in stool  Genitourinary: Negative for dysuria and hematuria  Musculoskeletal: Negative for arthralgias and myalgias  Skin: Negative for rash  Neurological: Negative for headaches  Psychiatric/Behavioral: Negative for dysphoric mood and suicidal ideas  The patient is nervous/anxious  Objective:  /74   Pulse 98   Temp 98 °F (36 7 °C) (Tympanic)   Ht 5' 8 2" (1 732 m)   Wt 117 kg (258 lb 12 8 oz)   BMI 39 12 kg/m²   Constitutional: Well-developed, well-nourished and Obese Body mass index is 39 12 kg/m²  Smith Hoang HEENT: No conjunctival injection  Pulmonary: No increased work of breathing or signs of respiratory distress  CV: Well-perfused  GI: Obese  Non-distended  MSK: No edema   Neuro: Oriented to person, place and time  Normal Speech  Normal gait  Psych: Normal affect and mood  Labs and Imaging  Recent labs and imaging have been personally reviewed    Lab Results   Component Value Date    WBC 10 82 (H) 10/22/2020    HGB 13 0 10/22/2020    HCT 38 5 10/22/2020    MCV 94 10/22/2020     10/22/2020     No results found for: NA, SODIUM, K, CL, CO2, ANIONGAP, AGAP, BUN, CREATININE, GLUC, GLUF, CALCIUM, AST, ALT, ALKPHOS, PROT, TP, BILITOT, TBILI, EGFR  No results found for: HGBA1C  No results found for: SNM8EHVLMUVD, TSH  No results found for: CHOLESTEROL  No results found for: HDL  No results found for: TRIG  No results found for: [address]

## 2022-06-11 ENCOUNTER — APPOINTMENT (OUTPATIENT)
Dept: LAB | Facility: MEDICAL CENTER | Age: 37
End: 2022-06-11
Payer: COMMERCIAL

## 2022-06-11 DIAGNOSIS — R73.9 HYPERGLYCEMIA: ICD-10-CM

## 2022-06-11 DIAGNOSIS — R53.83 OTHER FATIGUE: ICD-10-CM

## 2022-06-11 LAB
25(OH)D3 SERPL-MCNC: 29.4 NG/ML (ref 30–100)
ALBUMIN SERPL BCP-MCNC: 3.4 G/DL (ref 3.5–5)
ALP SERPL-CCNC: 104 U/L (ref 46–116)
ALT SERPL W P-5'-P-CCNC: 17 U/L (ref 12–78)
ANION GAP SERPL CALCULATED.3IONS-SCNC: 2 MMOL/L (ref 4–13)
AST SERPL W P-5'-P-CCNC: 13 U/L (ref 5–45)
BASOPHILS # BLD AUTO: 0.02 THOUSANDS/ΜL (ref 0–0.1)
BASOPHILS NFR BLD AUTO: 0 % (ref 0–1)
BILIRUB SERPL-MCNC: 0.55 MG/DL (ref 0.2–1)
BUN SERPL-MCNC: 14 MG/DL (ref 5–25)
CALCIUM ALBUM COR SERPL-MCNC: 10.5 MG/DL (ref 8.3–10.1)
CALCIUM SERPL-MCNC: 10 MG/DL (ref 8.3–10.1)
CHLORIDE SERPL-SCNC: 107 MMOL/L (ref 100–108)
CO2 SERPL-SCNC: 30 MMOL/L (ref 21–32)
CREAT SERPL-MCNC: 0.85 MG/DL (ref 0.6–1.3)
EOSINOPHIL # BLD AUTO: 0.32 THOUSAND/ΜL (ref 0–0.61)
EOSINOPHIL NFR BLD AUTO: 5 % (ref 0–6)
ERYTHROCYTE [DISTWIDTH] IN BLOOD BY AUTOMATED COUNT: 13.2 % (ref 11.6–15.1)
EST. AVERAGE GLUCOSE BLD GHB EST-MCNC: 97 MG/DL
GFR SERPL CREATININE-BSD FRML MDRD: 88 ML/MIN/1.73SQ M
GLUCOSE P FAST SERPL-MCNC: 93 MG/DL (ref 65–99)
HBA1C MFR BLD: 5 %
HCT VFR BLD AUTO: 41.9 % (ref 34.8–46.1)
HGB BLD-MCNC: 13.5 G/DL (ref 11.5–15.4)
IMM GRANULOCYTES # BLD AUTO: 0.01 THOUSAND/UL (ref 0–0.2)
IMM GRANULOCYTES NFR BLD AUTO: 0 % (ref 0–2)
INSULIN SERPL-ACNC: 9.3 MU/L (ref 3–25)
LYMPHOCYTES # BLD AUTO: 2.08 THOUSANDS/ΜL (ref 0.6–4.47)
LYMPHOCYTES NFR BLD AUTO: 30 % (ref 14–44)
MCH RBC QN AUTO: 29.5 PG (ref 26.8–34.3)
MCHC RBC AUTO-ENTMCNC: 32.2 G/DL (ref 31.4–37.4)
MCV RBC AUTO: 92 FL (ref 82–98)
MONOCYTES # BLD AUTO: 0.55 THOUSAND/ΜL (ref 0.17–1.22)
MONOCYTES NFR BLD AUTO: 8 % (ref 4–12)
NEUTROPHILS # BLD AUTO: 4.04 THOUSANDS/ΜL (ref 1.85–7.62)
NEUTS SEG NFR BLD AUTO: 57 % (ref 43–75)
NRBC BLD AUTO-RTO: 0 /100 WBCS
PLATELET # BLD AUTO: 228 THOUSANDS/UL (ref 149–390)
PMV BLD AUTO: 9.9 FL (ref 8.9–12.7)
POTASSIUM SERPL-SCNC: 4.2 MMOL/L (ref 3.5–5.3)
PROT SERPL-MCNC: 7.4 G/DL (ref 6.4–8.2)
RBC # BLD AUTO: 4.57 MILLION/UL (ref 3.81–5.12)
SODIUM SERPL-SCNC: 139 MMOL/L (ref 136–145)
TSH SERPL DL<=0.05 MIU/L-ACNC: 1.54 UIU/ML (ref 0.45–4.5)
WBC # BLD AUTO: 7.02 THOUSAND/UL (ref 4.31–10.16)

## 2022-06-11 PROCEDURE — 80053 COMPREHEN METABOLIC PANEL: CPT

## 2022-06-11 PROCEDURE — 83525 ASSAY OF INSULIN: CPT

## 2022-06-11 PROCEDURE — 36415 COLL VENOUS BLD VENIPUNCTURE: CPT

## 2022-06-11 PROCEDURE — 83036 HEMOGLOBIN GLYCOSYLATED A1C: CPT

## 2022-06-11 PROCEDURE — 84443 ASSAY THYROID STIM HORMONE: CPT

## 2022-06-11 PROCEDURE — 82306 VITAMIN D 25 HYDROXY: CPT

## 2022-06-11 PROCEDURE — 85025 COMPLETE CBC W/AUTO DIFF WBC: CPT

## 2022-06-11 PROCEDURE — 86618 LYME DISEASE ANTIBODY: CPT

## 2022-06-14 ENCOUNTER — OFFICE VISIT (OUTPATIENT)
Dept: BARIATRICS | Facility: CLINIC | Age: 37
End: 2022-06-14

## 2022-06-14 ENCOUNTER — TELEPHONE (OUTPATIENT)
Dept: BARIATRICS | Facility: CLINIC | Age: 37
End: 2022-06-14

## 2022-06-14 VITALS — HEIGHT: 68 IN | WEIGHT: 261.69 LBS | BODY MASS INDEX: 39.66 KG/M2

## 2022-06-14 DIAGNOSIS — R63.5 ABNORMAL WEIGHT GAIN: Primary | ICD-10-CM

## 2022-06-14 LAB — B BURGDOR IGG+IGM SER-ACNC: 40

## 2022-06-14 PROCEDURE — RECHECK: Performed by: DIETITIAN, REGISTERED

## 2022-06-14 PROCEDURE — WMPRO12: Performed by: DIETITIAN, REGISTERED

## 2022-06-14 NOTE — TELEPHONE ENCOUNTER
----- Message from Fartun Hensley DO sent at 6/14/2022  9:02 AM EDT -----  Hemoglobin A1c and insulin levels are normal     Keep appointment today with dietitian

## 2022-06-14 NOTE — PROGRESS NOTES
Weight Management Medical Nutrition Assessment    presented for the New Start session with the [facility]   Today's weight is   261 69#  Per dietary recall patient consumes excess calories from lack of structure with her eating, fast food choices  Has a 21 month old, still breastfeeding, patient is aware that weight loss can effect her milk supply, reports her son eats adequate food  We developed and reviewed a low calorie meal plan       Patient seen by Medical Provider in past 6 months:  yes  Requested to schedule appointment with Medical Provider: No      Anthropometric Measurements  Start Weight (#): 261 69  Current Weight (#): 261 69  TBW % Change from start weight:n/a  Ideal Body Weight (#):141  Goal Weight (#):180    Weight Loss History  Previous weight loss attempts: couch to 5k, isogenics,     Food and Nutrition Related History  Wake up: 7am  Bed Time: 10-11p  - sleep is interrupted, often to nurse her 21 month son    Food Recall  Breakfast:none, 16-20oz coffee with sweetened creamer   9:30-10a Snack: leftovers from dinner the night before or bowl of cereal (1 cup honey nut cherrios wit 2% or whole milk)  -yesterday lunch meat sandwich, strawberries because was going to [facility]  -at [place] had a soda cherry coke and chuck (22oz, filled twice and split with 2 of her kids)  Ice cream with strawberries shortcake sauce (3/4 cup)  Snack: piece of cheese  Cold brew chocolate coffee from [employer] ([place]  Dinner: bites of reheated nichelle, went out with her  to [employer]- [place], split nachos, breaded chicken hester sandwich (ate 1/4)  Snack:none  -currently reheating her kitchen    Beverages: water, regular soda, coffee/tea and alcohol  Volume of beverage intake: 24-30oz coffee (often sweetened), 24oz water, 8oz soda, 12-24oz seltzer fowler, 6oz wine once a week    Weekends: Same  Cravings: varies- brownies/chocolate  Trouble area of day: afternoons when kids are napping    Frequency of Eating out: 3-4 times a week (often lunch fast food-chick-lucinda)  Food restrictions: none  Cooking:   Food Shopping: self    Physical Activity Intake  Activity:activites of daily living, playing with her kids  Frequency:several times per week  Physical limitations/barriers to exercise: none    Estimated Needs  Energy  SECA: BMR:1810      X 1 3 -1000 =  [place] [address] Energy Needs: BMR : 1916 1-2# loss weekly sedentary:  1500-1699             1-2# loss weekly lightly active:0512-7772  Maintenance calories for sedentary activity level: 2299  Protein:77-96     (1 2-1 5g/kg IBW)  Fluid: 75oz    (35mL/kg IBW)    Nutrition Diagnosis  Yes; Overweight/obesity  related to Excess energy intake as evidenced by  BMI more than normative standard for age and sex (obesity-grade II 35-39  9)       Nutrition Intervention    Nutrition Prescription  Calories:1600  Protein:80-95g  Fluid:75oz    Meal Plan (Sam/Pro/Carb)  Breakfast:200-300/15-20  Snack:100-150/5-10  Lunch:300-400/20-30  Snack:100-150/5-10  Dinner:300-400/20-30  Snack:100-150/5-10    Nutrition Education:    Health[facility] Manual  Calorie controlled menu  Lean protein food choices  Healthy snack options  Food journaling tips      Nutrition Counseling:  Strategies: meal planning, portion sizes, healthy snack choices, hydration, fiber intake, protein intake, exercise, food journal      Monitoring and Evaluation:  Evaluation criteria:  Energy Intake  Meet protein needs  Maintain adequate hydration  Monitor weekly weight  Meal planning/preparation  Food journal   Decreased portions at mealtimes and snacks  Physical activity     Barriers to learning:none  Readiness to change: Preparation:  (Getting ready to change)   Comprehension: good  Expected Compliance: good

## 2022-06-23 ENCOUNTER — CLINICAL SUPPORT (OUTPATIENT)
Dept: BARIATRICS | Facility: CLINIC | Age: 37
End: 2022-06-23

## 2022-06-23 VITALS — WEIGHT: 255 LBS | BODY MASS INDEX: 38.65 KG/M2 | HEIGHT: 68 IN

## 2022-06-23 DIAGNOSIS — R63.5 ABNORMAL WEIGHT GAIN: Primary | ICD-10-CM

## 2022-06-23 PROCEDURE — RECHECK

## 2022-06-30 ENCOUNTER — CLINICAL SUPPORT (OUTPATIENT)
Dept: BARIATRICS | Facility: CLINIC | Age: 37
End: 2022-06-30

## 2022-06-30 VITALS — HEIGHT: 68 IN | BODY MASS INDEX: 38.71 KG/M2 | WEIGHT: 255.4 LBS

## 2022-06-30 DIAGNOSIS — R63.5 ABNORMAL WEIGHT GAIN: Primary | ICD-10-CM

## 2022-06-30 PROCEDURE — 3008F BODY MASS INDEX DOCD: CPT | Performed by: FAMILY MEDICINE

## 2022-06-30 PROCEDURE — RECHECK

## 2022-07-07 ENCOUNTER — CLINICAL SUPPORT (OUTPATIENT)
Dept: BARIATRICS | Facility: CLINIC | Age: 37
End: 2022-07-07

## 2022-07-07 VITALS — WEIGHT: 256.8 LBS | BODY MASS INDEX: 38.92 KG/M2 | HEIGHT: 68 IN

## 2022-07-07 DIAGNOSIS — R63.5 ABNORMAL WEIGHT GAIN: Primary | ICD-10-CM

## 2022-07-07 PROCEDURE — RECHECK

## 2022-07-14 ENCOUNTER — OFFICE VISIT (OUTPATIENT)
Dept: BARIATRICS | Facility: CLINIC | Age: 37
End: 2022-07-14

## 2022-07-14 ENCOUNTER — CLINICAL SUPPORT (OUTPATIENT)
Dept: BARIATRICS | Facility: CLINIC | Age: 37
End: 2022-07-14

## 2022-07-14 VITALS — BODY MASS INDEX: 38.92 KG/M2 | HEIGHT: 68 IN | WEIGHT: 256.8 LBS

## 2022-07-14 VITALS — HEIGHT: 68 IN | BODY MASS INDEX: 38.52 KG/M2 | WEIGHT: 254.2 LBS

## 2022-07-14 DIAGNOSIS — R63.5 ABNORMAL WEIGHT GAIN: Primary | ICD-10-CM

## 2022-07-14 PROCEDURE — RECHECK: Performed by: DIETITIAN, REGISTERED

## 2022-07-14 PROCEDURE — RECHECK

## 2022-07-14 NOTE — PROGRESS NOTES
Weight Management Medical Nutrition Assessment    presented for the month 1 f/u session with the [facility]   Today's weight is 254  2#   Patient is frustrated with her progress  However RD reviewed with patient that she has lost 7 5# since starting the program (1 8# per week, 2 9%)  Session spent helping patient with goal setting and offering support  Has a 21 month old, still breastfeeding, patient is aware that weight loss can effect her milk supply, reports her son eats adequate food  Patient spoke with RD at class who rec patient increase intake by 200 calories (increase protein to 6oz at dinner) due to ongoing breastfeeding (at least 4 times a day)  We developed and reviewed a low calorie meal plan       Patient seen by Medical Provider in past 6 months:  yes  Requested to schedule appointment with Medical Provider: No      Anthropometric Measurements  Start Weight (#): 261 69  Current Weight (#): 254 2  TBW % Change from start weight:2 9  Ideal Body Weight (#):141  Goal Weight (#):180    Weight Loss History  Previous weight loss attempts: couch to 5k, isogenics,     Food and Nutrition Related History  Wake up: 7am  Bed Time: 10-11p  - sleep is interrupted, often to nurse her 21 month son    Food Recall  Breakfast:none, 16-20oz coffee with sweetened creamer   9:30-10a Snack: leftovers from dinner the night before or bowl of cereal (1 cup honey nut cherrios wit 2% or whole milk)  -yesterday lunch meat sandwich, strawberries because was going to [employer]  -at [place] had a soda cherry coke and chuck (22oz, filled twice and split with 2 of her kids)  Ice cream with strawberries shortcake sauce (3/4 cup)  Snack: piece of cheese  Cold brew chocolate coffee from [employer] ([place])  Dinner: bites of reheated nichelle, went out with her  to [employer]- [place], split nachos, breaded chicken hester sandwich (ate 1/4)  Snack:none  -currently redoing her kitchen    Beverages: water, regular soda, coffee/tea and alcohol  Volume of beverage intake: 24-30oz coffee (often sweetened), 24oz water, 8oz soda, 12-24oz seltzer fowler, 6oz wine once a week    Weekends: Same  Cravings: varies- brownies/chocolate  Trouble area of day: afternoons when kids are napping    Frequency of Eating out: 3-4 times a week (often lunch fast food-chick-lucinda)  Food restrictions: none  Cooking:   Food Shopping: self    Physical Activity Intake- updates in bold  Activity:activites of daily living, playing with her kids has started to increase her activity with a home abner, has noticed dizziness  Rec patient to f/u with her PCP will also notify Dr Janelle Keating times per week  Physical limitations/barriers to exercise: none    Estimated Needs  Energy  SECA: BMR:1810      X 1 3 -1000 =  [employer] [place] [employer] Needs: BMR : 1916 1-2# loss weekly sedentary:  6663-6237             1-2# loss weekly lightly active:0184-5018  Maintenance calories for sedentary activity level: 2299  Protein:77-96     (1 2-1 5g/kg IBW)  Fluid: 75oz    (35mL/kg IBW)    Nutrition Diagnosis  Yes; Overweight/obesity  related to Excess energy intake as evidenced by  BMI more than normative standard for age and sex (obesity-grade II 35-39  9)       Nutrition Intervention    Nutrition Prescription  Calories:1600 increased to 1800 calories due to breastfeeding  Protein:80-95g  Fluid:75oz    Meal Plan (Sam/Pro/Carb)  Breakfast:200-300/15-20  Snack:100-150/5-10  Lunch:300-400/20-30  Snack:100-150/5-10  Dinner:300-400/20-30 increased to 500-600 calories (protein increased to [employer])  Snack:100-150/5-10    Nutrition Education:    Healthy [facility] Manual  Calorie controlled menu  Lean protein food choices  Healthy snack options  Food journaling tips      Nutrition Counseling:  Strategies: meal planning, portion sizes, healthy snack choices, hydration, fiber intake, protein intake, exercise, food journal      Monitoring and Evaluation:  Evaluation criteria:  Energy Intake  Meet protein needs  Maintain adequate hydration  Monitor weekly weight  Meal planning/preparation  Food journal   Decreased portions at mealtimes and snacks  Physical activity     Barriers to learning:none  Readiness to change: Action:  (Changing behavior)  Comprehension: good  Expected Compliance: good

## 2022-07-14 NOTE — PATIENT INSTRUCTIONS
Meal plan:  -make a grocery list on Saturday to include meals for 7 dinners  - dinner: 6oz protein choice, 1 cup whole grain choice, 1/2 cup fruit choice, 1/2 cup cooked vegetables

## 2022-07-15 ENCOUNTER — TELEPHONE (OUTPATIENT)
Dept: BARIATRICS | Facility: CLINIC | Age: 37
End: 2022-07-15

## 2022-07-15 NOTE — TELEPHONE ENCOUNTER
----- Message from John Neff RD sent at 7/14/2022  3:06 PM EDT -----  Farhan Milan was in today for her month 1 f/u in [facility]  She stated that she started exercising and has noticed dizziness  She denied any dizziness in the office today  I told her I would notify you but encouraged her to also f/u with her PCP prior to any exercise  She plans to contact her PCP today       Thank you,  Tay Arriaza

## 2022-07-15 NOTE — TELEPHONE ENCOUNTER
Please encourage patient to drink at least 64 oz of water daily  Please encourage patient to follow up with PCP for further evaluation

## 2022-07-21 ENCOUNTER — CLINICAL SUPPORT (OUTPATIENT)
Dept: BARIATRICS | Facility: CLINIC | Age: 37
End: 2022-07-21

## 2022-07-21 VITALS — WEIGHT: 251.6 LBS | HEIGHT: 68 IN | BODY MASS INDEX: 38.13 KG/M2

## 2022-07-21 DIAGNOSIS — R63.5 ABNORMAL WEIGHT GAIN: Primary | ICD-10-CM

## 2022-07-21 PROCEDURE — RECHECK

## 2022-08-08 NOTE — PROGRESS NOTES
Name             Marcos Contreras  Segal        Today's weight 254 2      [facility]     Mental health and wellness - Patient presents to the office today for support visit  She was recently on vacation, but is aware that she ate more carb's  Since back from vacation she is working on being more diligent on her eating habits  The pt shared she tries to work on healthy eating, but is so busy  Lane reviewed meal planning to decrease stress  Difficulty because she is not to organized  Martha Clements is not sleeping well 18 months drt birthday party is tomorrow and also had to plan her spouse 36 birthday party  Support system- spouse is supportive but has difficulty cooking what she needs him to cook  The pt was emotional in session feeling she is to overwhelmed  Lane reviewed supports that can help her and explored therapy  Eating behaviors - trying to eat three meals a day  Snacking with healthy food  Not drinking enough water  Coffee 1-2 cups a day creamer 2 table spoons  Aware she is not eating enough and when she does eats to fast  Feels that she has no time to make food and would like to make something fast    Activity -   Talking three mile walks  Increased recently and doing step challenged        Progress toward program requirements    Reviewed and discussed  Adequate hydration  Exercise  Meal planning and preparation  Lifestyle changes  Possible problems with poor eating habits  Support system to use when feeling overwhelmed

## 2022-08-11 ENCOUNTER — CLINICAL SUPPORT (OUTPATIENT)
Dept: BARIATRICS | Facility: CLINIC | Age: 37
End: 2022-08-11

## 2022-08-11 ENCOUNTER — OFFICE VISIT (OUTPATIENT)
Dept: BARIATRICS | Facility: CLINIC | Age: 37
End: 2022-08-11

## 2022-08-11 VITALS — WEIGHT: 254.2 LBS | HEIGHT: 68 IN | BODY MASS INDEX: 38.52 KG/M2

## 2022-08-11 VITALS — WEIGHT: 254.2 LBS | BODY MASS INDEX: 38.42 KG/M2

## 2022-08-11 DIAGNOSIS — R63.5 ABNORMAL WEIGHT GAIN: Primary | ICD-10-CM

## 2022-08-11 PROCEDURE — RECHECK

## 2022-08-16 ENCOUNTER — OFFICE VISIT (OUTPATIENT)
Dept: BARIATRICS | Facility: CLINIC | Age: 37
End: 2022-08-16

## 2022-08-16 VITALS — HEIGHT: 68 IN | BODY MASS INDEX: 38.09 KG/M2 | WEIGHT: 251.3 LBS

## 2022-08-16 DIAGNOSIS — R63.5 ABNORMAL WEIGHT GAIN: Primary | ICD-10-CM

## 2022-08-16 PROCEDURE — RECHECK: Performed by: DIETITIAN, REGISTERED

## 2022-08-16 NOTE — PROGRESS NOTES
Weight Management Medical Nutrition Assessment    presented for the month 2  f/u session with the [facility]   Today's weight is 251 3#   Overall patient lost 10 39lbs  Reviewed meal plan that patient brought with her  Patient is feeling better than last week when she met with the   Activity has continues to improve  Has a 21 month old, still breastfeeding, patient is aware that weight loss can effect her milk supply, reports her son eats adequate food  We developed and reviewed a low calorie meal plan  Patient seen by Medical Provider in past 6 months:  yes  Requested to schedule appointment with Medical Provider: No      Anthropometric Measurements  Start Weight (#): 261 69  Current Weight (#): 251 3  TBW % Change from start weight:4  Ideal Body Weight (#):141  Goal Weight (#):180    Weight Loss History  Previous weight loss attempts: couch to 5k, isogenics,     Food and Nutrition Related History  Wake up: 7am  Bed Time: 10-11p  - sleep is interrupted, often to nurse her 21 month son son's sleep is worse, still nursing over night    Food Recall-Patient brought meal plan with her  Reviewed- discussed ways to assist with meeting calorie goals   Patient plans to return to food journaling via myfitnesspal com    Breakfast:none, 16-20oz coffee with sweetened creamer   9:30-10a Snack: leftovers from dinner the night before or bowl of cereal (1 cup honey nut cherrios wit 2% or whole milk)  -yesterday lunch meat sandwich, strawberries because was going to [facility] & [address]  -at [place] had a soda cherry coke and chuck (22oz, filled twice and split with 2 of her kids)  Ice cream with strawberries shortcake sauce (3/4 cup)  Snack: piece of cheese  Cold brew chocolate coffee from [employer] ([place])  Dinner: bites of reheated nichelle, went out with her  to [facility], split nachos, breaded chicken hester sandwich (ate 1/4)  Snack:none  -currently redoing her kitchen    Beverages: water, regular soda, coffee/tea and alcohol  Volume of beverage intake: 24-30oz coffee (often sweetened), 24oz water, 8oz soda, 12-24oz seltzer fowler, 6oz wine once a week    Weekends: Same  Cravings: varies- brownies/chocolate  Trouble area of day: afternoons when kids are napping    Frequency of Eating out: 3-4 times a week (often lunch fast food-chick-lucinda)  Food restrictions: none  Cooking:   Food Shopping: self    Physical Activity Intake- updates in bold  Activity:activites of daily living, playing with her kids Denies dizziness with activity  Walking most days  Frequency:several times per week  Physical limitations/barriers to exercise: none    Estimated Needs  Energy  SECA: BMR:1810      X 1 3 -1000 =  [employer] [employer] Needs: BMR : 1916 1-2# loss weekly sedentary:  7407-1155             1-2# loss weekly lightly active:4812-1731  Maintenance calories for sedentary activity level: 2299  Protein:77-96     (1 2-1 5g/kg IBW)  Fluid: 75oz    (35mL/kg IBW)    Nutrition Diagnosis  Yes; Overweight/obesity  related to Excess energy intake as evidenced by  BMI more than normative standard for age and sex (obesity-grade II 35-39  9)       Nutrition Intervention    Nutrition Prescription  Calories:1600 increased to 1800 calories due to breastfeeding  Protein:80-95g  Fluid:75oz    Meal Plan (Sam/Pro/Carb)  Breakfast:200-300/15-20  Snack:100-150/5-10  Lunch:300-400/20-30  Snack:100-150/5-10  Dinner:300-400/20-30 increased to 500-600 calories (protein increased to [employer])  Snack:100-150/5-10    Nutrition Education:    [facility] Manual  Calorie controlled menu  Lean protein food choices  Healthy snack options  Food journaling tips      Nutrition Counseling:  Strategies: meal planning, portion sizes, healthy snack choices, hydration, fiber intake, protein intake, exercise, food journal      Monitoring and Evaluation:  Evaluation criteria:  Energy Intake  Meet protein needs  Maintain adequate hydration  Monitor weekly weight  Meal planning/preparation  Food journal   Decreased portions at mealtimes and snacks  Physical activity     Barriers to learning:none  Readiness to change: Action:  (Changing behavior)  Comprehension: good  Expected Compliance: good

## 2022-08-18 ENCOUNTER — CLINICAL SUPPORT (OUTPATIENT)
Dept: BARIATRICS | Facility: CLINIC | Age: 37
End: 2022-08-18

## 2022-08-18 VITALS — WEIGHT: 253.2 LBS | BODY MASS INDEX: 38.37 KG/M2 | HEIGHT: 68 IN

## 2022-08-18 DIAGNOSIS — R63.5 ABNORMAL WEIGHT GAIN: Primary | ICD-10-CM

## 2022-08-18 PROCEDURE — RECHECK

## 2022-08-25 ENCOUNTER — CLINICAL SUPPORT (OUTPATIENT)
Dept: BARIATRICS | Facility: CLINIC | Age: 37
End: 2022-08-25

## 2022-08-25 VITALS — HEIGHT: 68 IN | WEIGHT: 251.2 LBS | BODY MASS INDEX: 38.07 KG/M2

## 2022-08-25 DIAGNOSIS — R63.5 ABNORMAL WEIGHT GAIN: Primary | ICD-10-CM

## 2022-08-25 PROCEDURE — RECHECK

## 2022-09-01 ENCOUNTER — CLINICAL SUPPORT (OUTPATIENT)
Dept: BARIATRICS | Facility: CLINIC | Age: 37
End: 2022-09-01

## 2022-09-01 VITALS — BODY MASS INDEX: 38.55 KG/M2 | HEIGHT: 68 IN | WEIGHT: 254.4 LBS

## 2022-09-01 DIAGNOSIS — R63.5 ABNORMAL WEIGHT GAIN: Primary | ICD-10-CM

## 2022-09-01 PROCEDURE — RECHECK

## 2022-09-08 ENCOUNTER — CLINICAL SUPPORT (OUTPATIENT)
Dept: BARIATRICS | Facility: CLINIC | Age: 37
End: 2022-09-08

## 2022-09-08 VITALS — BODY MASS INDEX: 38.55 KG/M2 | WEIGHT: 254.4 LBS | HEIGHT: 68 IN

## 2022-09-08 DIAGNOSIS — R63.5 ABNORMAL WEIGHT GAIN: Primary | ICD-10-CM

## 2022-09-29 ENCOUNTER — CLINICAL SUPPORT (OUTPATIENT)
Dept: BARIATRICS | Facility: CLINIC | Age: 37
End: 2022-09-29

## 2022-09-29 VITALS — HEIGHT: 68 IN | WEIGHT: 249 LBS | BODY MASS INDEX: 37.74 KG/M2

## 2022-09-29 DIAGNOSIS — R63.5 ABNORMAL WEIGHT GAIN: Primary | ICD-10-CM

## 2022-09-29 PROCEDURE — RECHECK

## 2022-10-14 ENCOUNTER — OFFICE VISIT (OUTPATIENT)
Dept: BARIATRICS | Facility: CLINIC | Age: 37
End: 2022-10-14
Payer: COMMERCIAL

## 2022-10-14 VITALS
WEIGHT: 246.5 LBS | RESPIRATION RATE: 18 BRPM | DIASTOLIC BLOOD PRESSURE: 80 MMHG | HEART RATE: 79 BPM | SYSTOLIC BLOOD PRESSURE: 116 MMHG | BODY MASS INDEX: 37.36 KG/M2 | HEIGHT: 68 IN

## 2022-10-14 DIAGNOSIS — E66.9 OBESITY, CLASS II, BMI 35-39.9: Primary | ICD-10-CM

## 2022-10-14 PROCEDURE — 99214 OFFICE O/P EST MOD 30 MIN: CPT | Performed by: FAMILY MEDICINE

## 2022-10-14 NOTE — PROGRESS NOTES
Assessment/Plan:  Josue Grigsby was seen today for follow-up  Diagnoses and all orders for this visit:    Obesity, Class II, BMI 35-39 9    Currently participating in healthy core and has done well in her weight loss journey  Does have one more visit with the dietitian  A few medications discussed including phentermine and GLP1RA however at this time as patient is still nursing medication is contraindicated  Patient congratulated  Keep upcoming appointment with dietitian  Goals:  Food log (ie ) www myfitnesspal com,sparkpeople  com,loseit com,calorieking  com,etc  , No sugary beverages  At least 64oz of water daily  , Increase physical activity by 10 minutes daily and Gradually increase physical activity to a goal of 5 days per week for 30 minutes of MODERATE intensity PLUS 2 days per week of FULL BODY resistance training    Total time spent: 30 minutes with >50% face-to-face time with the patient  Follow up in approximately 3 months with Non-Surgical Physician/Advanced Practitioner  Subjective:   Chief Complaint   Patient presents with   • Follow-up     16 wk f/u        Patient ID: Wander Kraft  is a 39 y o  female with excess weight/obesity here to pursue weight management  Patient is pursuing [employer]-Intensive Lifestyle Intervention Program    Most recent notes and records were reviewed  Patient presents for medical weight management follow-up  Currently participating in healthy core  Continues to nurse her son    Curious about medication but not interested in starting medication at this time     -Initial weight loss goal of 5-10% weight loss for improved health  Wt Readings from Last 10 Encounters:   10/14/22 112 kg (246 lb 8 oz)   09/29/22 113 kg (249 lb)   09/08/22 115 kg (254 lb 6 4 oz)   09/01/22 115 kg (254 lb 6 4 oz)   08/25/22 114 kg (251 lb 3 2 oz)   08/18/22 115 kg (253 lb 3 2 oz)   08/16/22 114 kg (251 lb 4 8 oz)   08/11/22 115 kg (254 lb 3 2 oz)   08/11/22 115 kg (254 lb 3 2 oz) 07/21/22 114 kg (251 lb 9 6 oz)     Initial weight: 258 8lbs  Current weight: 246 5lbs  Change in weight: -12 3lbs      B- chicken sausage, egg and banana  S- fairlife protein shake  L- salad w/ grilled chicken  S- popcorn, carrots and hummus  D- lentil soup  S- popcorn or ice cream  Drinks- 40-50oz water daily, 16oz coffee w/ 2 tbsp sweetened creamer, 2 cans seltzer water  Alcohol- 1 glass wince /wk  Exercise- walking 1-3 mi/day      The following portions of the patient's history were reviewed and updated as appropriate: allergies, current medications, past family history, past medical history, past social history, past surgical history, and problem list     Review of Systems   Constitutional: Negative for fever  Respiratory: Negative for shortness of breath  Cardiovascular: Negative for chest pain  Objective:  /80 (BP Location: Left arm, Patient Position: Sitting, Cuff Size: Large)   Pulse 79   Resp 18   Ht 5' 8 2" (1 732 m)   Wt 112 kg (246 lb 8 oz)   BMI 37 26 kg/m²   Constitutional: Well-developed, well-nourished and Obese Body mass index is 37 26 kg/m²  Hamzah Quinn HEENT: No conjunctival injection  Pulmonary: No increased work of breathing or signs of respiratory distress  CV: Well-perfused  GI: Obese  Non-distended  MSK: No edema   Neuro: Oriented to person, place and time  Normal Speech  Normal gait  Psych: Normal affect and mood  Labs and Imaging  Recent labs and imaging have been personally reviewed    Lab Results   Component Value Date    WBC 7 02 06/11/2022    HGB 13 5 06/11/2022    HCT 41 9 06/11/2022    MCV 92 06/11/2022     06/11/2022     Lab Results   Component Value Date    SODIUM 139 06/11/2022    K 4 2 06/11/2022     06/11/2022    CO2 30 06/11/2022    AGAP 2 (L) 06/11/2022    BUN 14 06/11/2022    CREATININE 0 85 06/11/2022    GLUF 93 06/11/2022    CALCIUM 10 0 06/11/2022    AST 13 06/11/2022    ALT 17 06/11/2022    ALKPHOS 104 06/11/2022    TP 7 4 06/11/2022 TBILI 0 55 06/11/2022    EGFR 88 06/11/2022     Lab Results   Component Value Date    HGBA1C 5 0 06/11/2022     Lab Results   Component Value Date    UQF6BVLJYPER 1 540 06/11/2022     No results found for: CHOLESTEROL  No results found for: HDL  No results found for: TRIG  No results found for: [facility]

## 2022-10-25 ENCOUNTER — OFFICE VISIT (OUTPATIENT)
Dept: BARIATRICS | Facility: CLINIC | Age: 37
End: 2022-10-25

## 2022-10-25 VITALS — HEIGHT: 68 IN | BODY MASS INDEX: 37.22 KG/M2 | WEIGHT: 245.59 LBS

## 2022-10-25 DIAGNOSIS — R63.5 ABNORMAL WEIGHT GAIN: Primary | ICD-10-CM

## 2022-10-25 PROCEDURE — RECHECK: Performed by: DIETITIAN, REGISTERED

## 2022-10-25 NOTE — PROGRESS NOTES
Weight Management Medical Nutrition Assessment    presented for the month 3  f/u session with the [facility]   Today's weight is 245 59#  Overall patient lost 16 1lbs (6%)  SECA scale completed and reviewed  REE completed, 1843 (-1%)  Continues to breastfeed her 3 y/o son  Activity has become more consistent (nightly walks)  Plans to continue with RD    We developed and reviewed a low calorie meal plan  Patient seen by Medical Provider in past 6 months:  yes  Requested to schedule appointment with Medical Provider: No      Anthropometric Measurements  Start Weight (#): 261 69  Current Weight (#): 251 3  TBW % Change from start weight:4  Ideal Body Weight (#):141  Goal Weight (#):180    Weight Loss History  Previous weight loss attempts: couch to 5k, isogenics,     Food and Nutrition Related History  Wake up: 7am  Bed Time: 10-11p  - sleep is interrupted, often to nurse her 21 month son son's sleep is worse, still nursing over night    Food Recall-Reports struggling at this time with her meal plan  States that she just feels "off"  Meal plan reviewed     Breakfast:none, 16-20oz coffee with sweetened creamer   9:30-10a Snack: leftovers from dinner the night before or bowl of cereal (1 cup honey nut cherrios wit 2% or whole milk)  -yesterday lunch meat sandwich, strawberries because was going to [employer]  -at [place] had a soda cherry coke and chuck (22oz, filled twice and split with 2 of her kids)  Ice cream with strawberries shortcake sauce (3/4 cup)  Snack: piece of cheese  Cold brew chocolate coffee from [employer] ([place])  Dinner: bites of reheated nichelle, went out with her  to [employer]- mojito, split nachos, breaded chicken hester sandwich (ate 1/4)  Snack:none  -currently redoing her kitchen    Beverages: water, regular soda, coffee/tea and alcohol  Volume of beverage intake: 24-30oz coffee (often sweetened), 24oz water, 8oz soda, 12-24oz seltzer fowler, 6oz wine once a week    Weekends: Same  Cravings: varies- brownies/chocolate  Trouble area of day: afternoons when kids are napping    Frequency of Eating out: 3-4 times a week (often lunch fast food-chick-lucinda)  Food restrictions: none  Cooking:   Food Shopping: self    Physical Activity Intake- updates in bold  Activity:activites of daily living, playing with her kids Denies dizziness with activity  Walking most days  Frequency:several times per week  Physical limitations/barriers to exercise: none    Estimated Needs  Energy  REE: 1843 x 1 3 -1000= 1396  SECA: BMR:1751      X 1 3 -1000 =1276  [address]  [facility] Energy Needs: BMR : 1916 1-2# loss weekly sedentary:  0387-1991             1-2# loss weekly lightly active:5199-6130  Maintenance calories for sedentary activity level: 2299  Protein:77-96     (1 2-1 5g/kg IBW)  Fluid: 75oz    (35mL/kg IBW)    Nutrition Diagnosis  Yes; Overweight/obesity  related to Excess energy intake as evidenced by  BMI more than normative standard for age and sex (obesity-grade II 35-39  9)       Nutrition Intervention    Nutrition Prescription  Calories:1600 increased to 1800 calories due to breastfeeding  Protein:80-95g  Fluid:75oz    Meal Plan (Sam/Pro/Carb)  Breakfast:200-300/15-20  Snack:100-150/5-10  Lunch:300-400/20-30  Snack:100-150/5-10  Dinner:300-400/20-30 increased to 500-600 calories (protein increased to [employer])  Snack:100-150/5-10    Nutrition Education:    Healthy Core Manual  Calorie controlled menu  Lean protein food choices  Healthy snack options  Food journaling tips      Nutrition Counseling:  Strategies: meal planning, portion sizes, healthy snack choices, hydration, fiber intake, protein intake, exercise, food journal      Monitoring and Evaluation:  Evaluation criteria:  Energy Intake  Meet protein needs  Maintain adequate hydration  Monitor weekly weight  Meal planning/preparation  Food journal   Decreased portions at mealtimes and snacks  Physical activity     Barriers to learning:none  Readiness to change: Action:  (Changing behavior)  Comprehension: good  Expected Compliance: good

## 2022-11-29 ENCOUNTER — OFFICE VISIT (OUTPATIENT)
Dept: BARIATRICS | Facility: CLINIC | Age: 37
End: 2022-11-29

## 2022-11-29 VITALS — HEIGHT: 68 IN | WEIGHT: 246 LBS | BODY MASS INDEX: 37.28 KG/M2

## 2022-11-29 DIAGNOSIS — R63.5 ABNORMAL WEIGHT GAIN: Primary | ICD-10-CM

## 2022-11-29 NOTE — PROGRESS NOTES
Weight Management Medical Nutrition Assessment    presented for 1 of 3  f/u session with the [address] [place] [address]   Today's weight is 246#  Overall patient lost 15 7lbs (6%)  Almost weaned her son off from breastfeeding  Activity has become more consistent (nightly walks)  Even signed up for a 1/2 marathon walk in march to keep her motivated with activity over the winter  Struggling with eating routine  She has joined the December [employer] food journaling challenge which will be beneficial for her  Discussed working to have consistent breakfast intakes  Discussed walking on treadmil in afternoons while her kids nap to help eliminate grazing  We developed and reviewed a low calorie meal plan       Patient seen by Medical Provider in past 6 months:  yes  Requested to schedule appointment with Medical Provider: No      Anthropometric Measurements  Start Weight (#): 261 69  Current Weight (#): 246  TBW % Change from start weight:6  Ideal Body Weight (#):141  Goal Weight (#):180    Weight Loss History  Previous weight loss attempts: couch to 5k, isogenics,     Food and Nutrition Related History  Wake up: 7am  Bed Time: 10-11p  - sleep is interrupted, often to nurse her 21 month son son's sleep is worse, still nursing over night    Food Recall-update in bold  Breakfast:none, 16oz coffee with sweetened creamer   9:30-10a Snack: leftovers from dinner the night before or bowl of cereal (1 cup honey nut cherrios with 2% or whole milk) trying to do protein, 1 egg with 1 egg white, blueberries and 1 serving of low fat greek yogurt  L: skipped  Snack: piece of cheese variety, grazes throughout the afternoon  Cold brew chocolate coffee from [employer] ([place])  Dinner: bites of reheated nichelle, went out with her  to [employer], split nachos, breaded chicken hester sandwich (ate 1/4) cooked premade Buffy food (from [employer], rice, lentils)  Snack:none  -currently redoing her kitchen still ongoing    Beverages: water, regular soda, coffee/tea and alcohol  Volume of beverage intake: 24-30oz 16oz coffee (often sweetened), 24oz 32oz water, 8oz soda none, 12-24oz seltzer fowler, 6oz wine once a week    Weekends: Same  Cravings: varies- brownies/chocolate baked goods and starchy things  Trouble area of day: afternoons when kids are napping    Frequency of Eating out: 3-4 times a week (often lunch fast food-chick-lucinda) no more for snacks, maybe chick lucinda for a salad once a week  Food restrictions: none  Cooking:   Food Shopping: self    Physical Activity Intake  Activity:walking every 1-2 days for 2-4 miles at a moderate pace  -signed up to walk a 1/2 marathon in March (has a treadmill at home if needed)  Frequency:several times per week  Physical limitations/barriers to exercise: none    Estimated Needs  Energy  REE: 1843 x 1 3 -1000= 1396  SECA: BMR:1751      X 1 3 -1000 =1276  [place] Energy Needs: BMR : 1916 1-2# loss weekly sedentary:  5011-1391             1-2# loss weekly lightly active:8061-8179  Maintenance calories for sedentary activity level: 2299  Protein:77-96     (1 2-1 5g/kg IBW)  Fluid: 75oz    (35mL/kg IBW)    Nutrition Diagnosis  Yes; Overweight/obesity  related to Excess energy intake as evidenced by  BMI more than normative standard for age and sex (obesity-grade II 35-39  9)       Nutrition Intervention    Nutrition Prescription  Calories:1600 increased to 1800 calories due to breastfeeding  Protein:80-95g  Fluid:75oz    Meal Plan (Sam/Pro/Carb)  Breakfast:200-300/15-20  Snack:100-150/5-10  Lunch:300-400/20-30  Snack:100-150/5-10  Dinner:300-400/20-30 increased to 500-600 calories (protein increased to [employer])  Snack:100-150/5-10    Nutrition Education:    Healthy Core Manual  Calorie controlled menu  Lean protein food choices  Healthy snack options  Food journaling tips      Nutrition Counseling:  Strategies: meal planning, portion sizes, healthy snack choices, hydration, fiber intake, protein intake, exercise, food journal      Monitoring and Evaluation:  Evaluation criteria:  Energy Intake  Meet protein needs  Maintain adequate hydration  Monitor weekly weight  Meal planning/preparation  Food journal   Decreased portions at mealtimes and snacks  Physical activity     Barriers to learning:none  Readiness to change: Action:  (Changing behavior)  Comprehension: good  Expected Compliance: good

## 2023-01-16 ENCOUNTER — OFFICE VISIT (OUTPATIENT)
Dept: BARIATRICS | Facility: CLINIC | Age: 38
End: 2023-01-16

## 2023-01-16 ENCOUNTER — TELEPHONE (OUTPATIENT)
Dept: BARIATRICS | Facility: CLINIC | Age: 38
End: 2023-01-16

## 2023-01-16 VITALS
RESPIRATION RATE: 16 BRPM | OXYGEN SATURATION: 98 % | BODY MASS INDEX: 36.68 KG/M2 | HEART RATE: 86 BPM | WEIGHT: 242 LBS | DIASTOLIC BLOOD PRESSURE: 82 MMHG | HEIGHT: 68 IN | SYSTOLIC BLOOD PRESSURE: 110 MMHG

## 2023-01-16 DIAGNOSIS — E66.9 OBESITY, CLASS II, BMI 35-39.9: Primary | ICD-10-CM

## 2023-01-16 PROBLEM — E66.812 OBESITY, CLASS II, BMI 35-39.9: Status: ACTIVE | Noted: 2023-01-16

## 2023-01-16 NOTE — PROGRESS NOTES
Assessment/Plan:    Obesity, Class II, BMI 35-39 9  -Patient is pursuing [facility]-Intensive Lifestyle Intervention Program  -Initial weight loss goal of 5-10% weight loss for improved health  -Screening labs 6/11/22    Initial:261 69  Current:242  Change:-19 69  Goal:    Goals:  -recommend restarting food logging 1600 calories  -discussed possible meal replacement at lunch and samples of new directions products given  -continue walking schedule  -recommend increasing water to at least 64 oz    Discussed medication options and not interested right now  Also discussed possible VLCD but would wait until completes walking 1/2 marathon          Follow up in approximately 2 months with Non-Surgical Physician/Advanced Practitioner  Diagnoses and all orders for this visit:    Obesity, Class II, BMI 35-39 9    I have spent 30 minutes with Patient  today in which greater than 50% of this time was spent in counseling/coordination of care regarding Intructions for management, Patient and family education, Risk factor reductions and Impressions  Subjective:   Chief Complaint   Patient presents with   • Follow-up     MWM 3 mth fu         Patient ID: Ronnell Mccormack  is a 40 y o  female with excess weight/obesity here to pursue weight managment  Patient is pursuing [facility]-Intensive Lifestyle Intervention Program      HPI  HPI  Here for followup after heal[facility]  Initially doing myfitnesspal but then just being more mindful  She has lost more weight since no longer doing food tracking but is walking more  She is signed up for a walking 1/2 marathon in March  She does feel like portion is a concern, eg ate 4 pieces of pizza this weekend, but also does feel like she does picking at foods       She is no longer breast feeding and may have been interested in medication options    Wt Readings from Last 10 Encounters:   01/16/23 110 kg (242 lb)   11/29/22 112 kg (246 lb)   10/25/22 111 kg (245 lb 9 4 oz)   10/14/22 112 kg (246 lb 8 oz)   09/29/22 113 kg (249 lb)   09/08/22 115 kg (254 lb 6 4 oz)   09/01/22 115 kg (254 lb 6 4 oz)   08/25/22 114 kg (251 lb 3 2 oz)   08/18/22 115 kg (253 lb 3 2 oz)   08/16/22 114 kg (251 lb 4 8 oz)       Food logging:not currently  Increased appetite/cravings:someincreased appetite  Fruit/Vegetable servings:  Exercise:walking 4-5 times a week  Walks 3-4 miles  Not walking 9608-1030 days  Hydration:water, coffee, fairlife, sometiems OJ, sometimes coke zero         Diet Recall:  B: turkey delight kimi dnea  S: sometimes fairlife milk or cheese stick  L: variable: sometimes picking    Colonoscopy-Not applicable    The following portions of the patient's history were reviewed and updated as appropriate: She  has a past medical history of Anxiety and Depression  She   Patient Active Problem List    Diagnosis Date Noted   • Obesity, Class II, BMI 35-39 9 01/16/2023   • Anxiety 10/22/2020   • Positive GBS test 09/27/2020   • 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     She  has a past surgical history that includes Oophorectomy  Her family history includes Cancer in her mother; Heart attack in her father  She  reports that she has never smoked  She has never used smokeless tobacco  She reports current alcohol use  She reports that she does not use drugs  No current outpatient medications on file  No current facility-administered medications for this visit  No current outpatient medications on file prior to visit  No current facility-administered medications on file prior to visit  She has No Known Allergies       Review of Systems   Constitutional: Negative for fever  Respiratory: Negative for shortness of breath  Cardiovascular: Negative for chest pain and palpitations  Gastrointestinal: Negative for abdominal pain, constipation, diarrhea and vomiting  Genitourinary: Negative for difficulty urinating  Skin: Negative for rash  Neurological: Negative for headaches  Psychiatric/Behavioral: Negative for dysphoric mood  The patient is not nervous/anxious  Objective:    /82   Pulse 86   Resp 16   Ht 5' 8 2" (1 732 m)   Wt 110 kg (242 lb)   SpO2 98%   BMI 36 58 kg/m²      Physical Exam  Vitals and nursing note reviewed  Constitutional:       General: She is not in acute distress  Appearance: She is well-developed  She is obese  HENT:      Head: Normocephalic and atraumatic  Eyes:      Conjunctiva/sclera: Conjunctivae normal    Neck:      Thyroid: No thyromegaly  Pulmonary:      Effort: Pulmonary effort is normal  No respiratory distress  Skin:     Findings: No rash (visible)  Neurological:      Mental Status: She is alert and oriented to person, place, and time     Psychiatric:         Mood and Affect: Mood normal          Behavior: Behavior normal

## 2023-01-16 NOTE — ASSESSMENT & PLAN NOTE
-Patient is pursuing [facility]-Intensive Lifestyle Intervention Program  -Initial weight loss goal of 5-10% weight loss for improved health  -Screening labs 6/11/22    Initial:261 69  Current:242  Change:-19 69  Goal:    Goals:  -recommend restarting food logging 1600 calories  -discussed possible meal replacement at lunch and samples of new directions products given  -continue walking schedule  -recommend increasing water to at least 64 oz    Discussed medication options and not interested right now    Also discussed possible VLCD but would wait until completes walking 1/2 marathon

## 2023-01-16 NOTE — TELEPHONE ENCOUNTER
Reached out to Pt re: VM  Left VM for Pt confirming appts today and on 1/19  Encouraged Pt contact office with questions

## 2023-01-19 ENCOUNTER — TELEPHONE (OUTPATIENT)
Dept: BARIATRICS | Facility: CLINIC | Age: 38
End: 2023-01-19

## 2023-02-27 ENCOUNTER — OFFICE VISIT (OUTPATIENT)
Dept: OBGYN CLINIC | Facility: CLINIC | Age: 38
End: 2023-02-27

## 2023-02-27 ENCOUNTER — APPOINTMENT (OUTPATIENT)
Dept: RADIOLOGY | Age: 38
End: 2023-02-27

## 2023-02-27 VITALS
HEIGHT: 68 IN | BODY MASS INDEX: 36.68 KG/M2 | SYSTOLIC BLOOD PRESSURE: 132 MMHG | WEIGHT: 242 LBS | DIASTOLIC BLOOD PRESSURE: 81 MMHG | HEART RATE: 69 BPM

## 2023-02-27 DIAGNOSIS — M79.671 RIGHT FOOT PAIN: Primary | ICD-10-CM

## 2023-02-27 DIAGNOSIS — M79.671 RIGHT FOOT PAIN: ICD-10-CM

## 2023-02-27 DIAGNOSIS — M84.374A STRESS REACTION OF RIGHT FOOT, INITIAL ENCOUNTER: ICD-10-CM

## 2023-02-27 NOTE — PROGRESS NOTES
Orthopaedic Surgery - Office Note  Renee Holloway (3 y o  female)   : 1985   MRN: [id number]  Encounter Date: 2023    Chief Complaint   Patient presents with   • Right Foot - Pain         Assessment/Plan  Diagnoses and all orders for this visit:    Right foot pain  -     XR foot 3+ vw right; Future  -     Durable Medical Equipment  -     MRI foot/forefoot toes right wo contrast; Future  -     Ambulatory Referral to Podiatry; Future    Stress reaction of right foot, initial encounter  -     Durable Medical Equipment  -     MRI foot/forefoot toes right wo contrast; Future  -     Ambulatory Referral to Podiatry; Future    Lower started a new workout regime resulting in diffuse right foot pain consistent with a stress reaction in her metatarsals  I am concerned about an occult stress fracture which I am recommending an MRI and starting a high tide boot  She will use aspirin 81 mg twice daily for DVT prophylaxis  She will follow-up with podiatry to discuss the MRI results and further treatment  She will refrain from working out unless it is low impact such as exercise bike  She may ice the foot for comfort 20 minutes on 1 hour off 3 times a day  Return for Recheck with podiatry to discuss MRI of right foot to rule out stress fracture  History of Present Illness  This is a new patient with right foot pain  Patient locates the pain to the metatarsals of the right foot  The foot pain started with returning to a workout regime while trying to lose weight  She reports she has been doing running, walking, and treadmill work resulting in progressively worsening right foot pain without any known trauma  She reports pain with weightbearing  She reports she has pain at rest as well and the last time she was able to workout was last Wednesday she denies any calf pain or ankle pain  She has not had problems like this in the past she denies any contributing medical history      Review of Systems  Pertinent items are noted in HPI  All other systems were reviewed and are negative  Physical Exam  /81 (BP Location: Right arm, Patient Position: Sitting, Cuff Size: Standard)   Pulse 69   Ht 5' 8" (1 727 m)   Wt 110 kg (242 lb)   BMI 36 80 kg/m²   Cons: Appears well  No apparent distress  Psych: Alert  Oriented x3  Mood and affect normal   Eyes: PERRLA, EOMI  Resp: Normal effort  No audible wheezing or stridor  CV: Palpable pulse  No discernable arrhythmia  Lymph:  No palpable cervical, axillary, or inguinal lymphadenopathy  Skin: Warm  No palpable masses  No visible lesions  Neuro: Normal muscle tone  Normal and symmetric DTR's  Patient's right foot is without skin breakdown lesion or signs of infection  There is no ecchymosis or soft tissue edema  She is markedly tender to palpation on the plantar surface of the first second third and fourth metatarsal   She is mildly tender to palpation on the dorsal surface of the first second third and fourth metatarsal   She does not have any significant tenderness to palpation at the fifth metatarsal   She has full ankle range of motion to plantarflexion dorsiflexion inversion and eversion with 5 out of 5 strength  Inversion and eversion strength testing increased pain located in the midfoot  She has no tenderness at the MTP joint  She has pain with plantarflexion  She is nontender over the medial and lateral malleolus  She has no tenderness at the ATFL CFL or deltoid ligament  She is nontender to palpation over the posterior tibial tendon or peroneal tendon  She has no tenderness at the calcaneus Achilles or plantar fascia  She has no calf tenderness and a negative Homans  Dorsalis pedis and posterior tibial pulses are +2          Studies Reviewed  X-rays performed in the office today 3 views of the right foot show no acute fractures or dislocations  No significant degenerative changes are seen    X-rays were reviewed from an orthopedic standpoint will await official radiologist interpretation    Procedures  No procedures today  Medical, Surgical, Family, and Social History  The patient's medical history, family history, and social history, were reviewed and updated as appropriate  Past Medical History:   Diagnosis Date   • Anxiety    • Depression        Past Surgical History:   Procedure Laterality Date   • OOPHORECTOMY         Family History   Problem Relation Age of Onset   • Cancer Mother         lung cancer, smoker   • Heart attack Father         in 2018   • Alcohol abuse Neg Hx    • Substance Abuse Neg Hx    • Mental illness Neg Hx        Social History     Occupational History   • Not on file   Tobacco Use   • Smoking status: Never   • Smokeless tobacco: Never   Vaping Use   • Vaping Use: Never used   Substance and Sexual Activity   • Alcohol use: Yes     Comment: socially   • Drug use: Never   • Sexual activity: Not on file       No Known Allergies    No current outpatient medications on file        Kimberley German PA-C

## 2023-02-27 NOTE — PATIENT INSTRUCTIONS
Walking Boot   WHAT YOU NEED TO KNOW:   A walking boot is a type of medical shoe used to protect the foot and ankle after an injury or surgery  The boot can be used for broken bones, tendon injuries, severe sprains, or shin splints  A walking boot helps keep the foot stable so it can heal  It can keep your weight off an area, such as your toe, as it heals  Most boots have between 2 and 5 adjustable straps and go mid-way up your calf  DISCHARGE INSTRUCTIONS:   Call your doctor if:   You have pain or discomfort that does not go away when you deflate the air chamber  You cannot seem to get the boot to fit correctly  You have questions or concerns about your condition or care  How to put on the walking boot:  You may want to wear a large sock  Sit down and place your heel all the way to the back of the boot  Wrap the soft liner around your foot and leg  Place the front piece over the liner  Start to fasten the straps closest to your toes then move up your leg  Tighten the straps so they are snug but not too tight  The boot should limit movement but not cut off your blood flow  If your boot has one or more air chambers, pump them up as directed by your healthcare provider  Stand up and take a few steps to practice walking  Deflate the air chambers before removing the boot  What else you need to know:   Check your foot and toes often  Check your foot and toes for redness and swelling  If your toes are red, swollen, numb, or tingly, loosen your straps or deflate the air chamber  Over time, the swelling from the injury or surgery will decrease  When this happens, you may need to tighten the straps  Be careful when you walk on wet surfaces  The boot may be slippery  Follow the instructions to wash the liner  Remove the liner and wash it by hand in cold water with a mild detergent  Do not use a washing machine or dryer  Place the liner flat to dry   Wash the plastic parts with a damp cloth and mild soap  Ask about removing the boot to bathe or for motion exercises  You may need to leave the boot on when you bathe  Cover it with a plastic bag and tape the bag closed around your leg  Follow up with your doctor as directed:  Write down your questions so you remember to ask them during your visits  © Copyright [employer]ardenas 2022 Information is for End User's use only and may not be sold, redistributed or otherwise used for commercial purposes  The above information is an  only  It is not intended as medical advice for individual conditions or treatments  Talk to your doctor, nurse or pharmacist before following any medical regimen to see if it is safe and effective for you

## 2023-02-28 ENCOUNTER — TELEPHONE (OUTPATIENT)
Dept: OBGYN CLINIC | Facility: CLINIC | Age: 38
End: 2023-02-28

## 2023-02-28 NOTE — TELEPHONE ENCOUNTER
CALLED TO [facility] F/U APPT TO FREDDIE MRI RESULTS NO ANSWER LEFT MESSAGE

## 2023-03-09 ENCOUNTER — HOSPITAL ENCOUNTER (OUTPATIENT)
Facility: MEDICAL CENTER | Age: 38
Discharge: HOME | End: 2023-03-09

## 2023-03-09 DIAGNOSIS — M79.671 RIGHT FOOT PAIN: ICD-10-CM

## 2023-03-09 DIAGNOSIS — M84.374A STRESS REACTION OF RIGHT FOOT, INITIAL ENCOUNTER: ICD-10-CM

## 2023-03-10 ENCOUNTER — TELEPHONE (OUTPATIENT)
Dept: OBGYN CLINIC | Facility: HOSPITAL | Age: 38
End: 2023-03-10

## 2023-03-10 NOTE — TELEPHONE ENCOUNTER
Caller: Kenyetta([facility])    Doctor: Priscilla Brito for call:  There was immediate findings on the patients MRI on the right foot    Call back#: [phone]

## 2023-03-15 ENCOUNTER — TELEPHONE (OUTPATIENT)
Dept: PODIATRY | Facility: CLINIC | Age: 38
End: 2023-03-15

## 2023-03-15 ENCOUNTER — OFFICE VISIT (OUTPATIENT)
Dept: PODIATRY | Facility: CLINIC | Age: 38
End: 2023-03-15

## 2023-03-15 VITALS
DIASTOLIC BLOOD PRESSURE: 81 MMHG | HEIGHT: 68 IN | BODY MASS INDEX: 36.53 KG/M2 | WEIGHT: 241 LBS | HEART RATE: 76 BPM | SYSTOLIC BLOOD PRESSURE: 116 MMHG

## 2023-03-15 DIAGNOSIS — M79.671 RIGHT FOOT PAIN: ICD-10-CM

## 2023-03-15 DIAGNOSIS — M84.374A STRESS REACTION OF RIGHT FOOT, INITIAL ENCOUNTER: Primary | ICD-10-CM

## 2023-03-15 DIAGNOSIS — M84.374A STRESS REACTION OF RIGHT FOOT, INITIAL ENCOUNTER: ICD-10-CM

## 2023-03-15 RX ORDER — MELOXICAM 15 MG/1
15 TABLET ORAL DAILY
Qty: 30 TABLET | Refills: 1 | Status: SHIPPED | OUTPATIENT
Start: 2023-03-15

## 2023-03-15 NOTE — TELEPHONE ENCOUNTER
Caller: patient    Doctor: Apolinar Patient / Anselmo Apo    Reason for call: Daniela Gutierrez is giving her more pain  When walking she feels like there is more pain in the ball of her foot      Call back#: [phone]

## 2023-03-15 NOTE — PROGRESS NOTES
Assessment/Plan:    No problem-specific Assessment & Plan notes found for this encounter  Diagnoses and all orders for this visit:    Right foot pain  -     Ambulatory Referral to Podiatry  -     Osteogenesic Stimulator  -     meloxicam (Mobic) 15 mg tablet; Take 1 tablet (15 mg total) by mouth daily  -     Vitamin D Panel; Future  -     Cam Boot    Stress reaction of right foot, initial encounter  -     Ambulatory Referral to Podiatry  -     Osteogenesic Stimulator  -     meloxicam (Mobic) 15 mg tablet; Take 1 tablet (15 mg total) by mouth daily  -     Vitamin D Panel;  Future  -     Cam Boot      -X-rays 3 views nonweightbearing reviewed and do show no acute osseous abnormality and stress fracture is not visible at the base of the second metatarsal  - MRI was reviewed and does show significant T2 hyperintensity at the base of the second metatarsal with mild T1 replacement along a fracture line   -The high tide Cam boot was causing her more discomfort, pain, disability then needed, we will transition her into a low tide Cam boot to allow her to continue to have a cessation and use of the rocker-bottom  - Rx given for Mobic for management of her inflammatory pain  - We will also obtain a new onset vitamin D panel due to her history of vitamin D deficiency without replacement, she is return in 6 weeks and we will evaluate both the vitamin D panel and repeat evaluation of her stress fracture with repeat x-ray  - She is to bring her shoes on the next visit and hopefully will transition her into rigid shoes  -We will also prescribe an exigent bone stimulator as this is a new onset stress fracture and a high risk area of nonhealing, this is the base the second metatarsal in the watershed area fracture gap is less than 1 mm    Orthopedic injury treatment    Date/Time: 3/15/2023 11:51 AM  Performed by: Sloan Cunningham DPM  Authorized by: Sloan Cunningham DPM   Universal Protocol:  Consent: Verbal consent obtained  Consent given by: patient  Timeout called at: 3/15/2023 11:51 AM   Patient understanding: patient states understanding of the procedure being performed  Patient identity confirmed: verbally with patient      Injury location:  Foot  Location details:  Right foot  Injury type:  Fracture  Fracture type: second metatarsal    Neurovascular status: Neurovascularly intact    Manipulation performed?: No    Neurovascular status: Neurovascularly intact          Subjective:      Patient ID: Myriam Butts is a 40 y o  female  Patient presents for evaluation of management of her right foot pain  This has been going on since early February  Has been walking more to lose weight  And she did buy new shoes  She notes achy pain that is worsening as the day goes on  She did have an MRI and was told that she has distracted fracture  She was immobilized in a high tide Cam boot  Has been ambulating in this she notes more discomfort in the high tide Cam boot than without  The following portions of the patient's history were reviewed and updated as appropriate: allergies, current medications, past family history, past medical history, past social history, past surgical history and problem list     Review of Systems   Constitutional: Negative for chills and fever  HENT: Negative for ear pain and sore throat  Eyes: Negative for pain and visual disturbance  Respiratory: Negative for cough and shortness of breath  Cardiovascular: Negative for chest pain and palpitations  Gastrointestinal: Negative for abdominal pain and vomiting  Genitourinary: Negative for dysuria and hematuria  Musculoskeletal: Negative for arthralgias and back pain  Skin: Negative for color change and rash  Neurological: Negative for seizures and syncope  All other systems reviewed and are negative  Objective: There were no vitals taken for this visit  Physical Exam  Vitals reviewed  Constitutional:       Appearance: Normal appearance  She is obese  HENT:      Head: Normocephalic and atraumatic  Nose: Nose normal       Mouth/Throat:      Mouth: Mucous membranes are moist       Pharynx: Oropharynx is clear  Eyes:      Conjunctiva/sclera: Conjunctivae normal       Pupils: Pupils are equal, round, and reactive to light  Cardiovascular:      Pulses: Normal pulses  Pulmonary:      Effort: Pulmonary effort is normal    Musculoskeletal:         General: Swelling and tenderness present  Comments: Mild diffuse swelling on the dorsal aspect of the right foot  No ecchymosis, pain with palpation, trigger point base second metatarsal    Skin:     Capillary Refill: Capillary refill takes less than 2 seconds  Neurological:      General: No focal deficit present  Mental Status: She is alert and oriented to person, place, and time  Mental status is at baseline

## 2023-04-03 ENCOUNTER — OFFICE VISIT (OUTPATIENT)
Dept: BARIATRICS | Facility: CLINIC | Age: 38
End: 2023-04-03

## 2023-04-03 VITALS
RESPIRATION RATE: 16 BRPM | BODY MASS INDEX: 35.98 KG/M2 | HEIGHT: 68 IN | WEIGHT: 237.4 LBS | SYSTOLIC BLOOD PRESSURE: 110 MMHG | DIASTOLIC BLOOD PRESSURE: 60 MMHG | HEART RATE: 64 BPM

## 2023-04-03 DIAGNOSIS — E66.9 OBESITY, CLASS II, BMI 35-39.9: Primary | ICD-10-CM

## 2023-04-03 NOTE — ASSESSMENT & PLAN NOTE
- Weight not at goal  - Patient is interested in Conservative Program  - Labs reviewed: As below  - Discussed various AOM FDA approved to help with weight loss  Patient not interested at this time  She enjoyed the healthy core program and believes that she did well with the accountability of the weekly follow-ups  She wishes to start the [employer] program at this time  General Recommendations:  Nutrition:  Eat breakfast daily  Do not skip meals  Food log (ie ) www myfitnesspal com, sparkpeople  com, loseit com, calorieking  com, etc     Practice mindful eating  Be sure to set aside time to eat, eat slowly, and savor your food  Hydration: At least 64oz of water daily  No sugar sweetened beverages  No juice (eat the fruit instead)  Exercise:  Studies have shown that the ideal exercise goal is somewhere between 150 to 300 minutes of moderate intensity exercise a week  Start with exercising 10 minutes every other day and gradually increase physical activity with a goal of at least 150 minutes of moderate intensity exercise a week, divided over at least 3 days a week  An example of this would be exercising 30 minutes a day, 5 days a week  Resistance training can increase muscle mass and increase our resting metabolic rate  FULL BODY resistance training is recommended 2-3 times a week  Do not do this on consecutive days to allow for muscle recovery  Aim for a bare minimum 5000 steps, even on days you do not exercise  Monitoring:   Weigh yourself daily  If this causes undue stress, then just weigh yourself once a week  Weigh yourself the same time of the day with the same amount of clothing on  Preferably this should be done after waking up, before you eat, and with no clothing or minimal clothing on  Specific Goals:  Food log (ie ) www myfitnesspal com,sparkpeople  com,[employer] com,calorieking  com,etc    Gradually increase physical activity to a goal of 5 days per week for 30 minutes of MODERATE intensity PLUS 2 days per week of FULL BODY resistance training    [employer] program recommended      Calorie goal:  1600 calories a day    Return visit:  2-3 months

## 2023-04-03 NOTE — PROGRESS NOTES
Assessment/Plan:  Chari Morse was seen today for follow-up  Diagnoses and all orders for this visit:    Obesity, Class II, BMI 35-39 9       Obesity, Class II, BMI 35-39 9    - Weight not at goal  - Patient is interested in Conservative Program  - Labs reviewed: As below  - Discussed various AOM FDA approved to help with weight loss  Patient not interested at this time  She enjoyed the healthy core program and believes that she did well with the accountability of the weekly follow-ups  She wishes to start the [employer] program at this time  General Recommendations:  Nutrition:  Eat breakfast daily  Do not skip meals  Food log (ie ) www myfitnesspal com, sparkpeople  com, loseit com, calorieking  com, etc     Practice mindful eating  Be sure to set aside time to eat, eat slowly, and savor your food  Hydration: At least 64oz of water daily  No sugar sweetened beverages  No juice (eat the fruit instead)  Exercise:  Studies have shown that the ideal exercise goal is somewhere between 150 to 300 minutes of moderate intensity exercise a week  Start with exercising 10 minutes every other day and gradually increase physical activity with a goal of at least 150 minutes of moderate intensity exercise a week, divided over at least 3 days a week  An example of this would be exercising 30 minutes a day, 5 days a week  Resistance training can increase muscle mass and increase our resting metabolic rate  FULL BODY resistance training is recommended 2-3 times a week  Do not do this on consecutive days to allow for muscle recovery  Aim for a bare minimum 5000 steps, even on days you do not exercise  Monitoring:   Weigh yourself daily  If this causes undue stress, then just weigh yourself once a week  Weigh yourself the same time of the day with the same amount of clothing on  Preferably this should be done after waking up, before you eat, and with no clothing or minimal clothing on      Specific Goals: Food log (ie ) www myfitnesspal com,sparkpeople  com,loseit com,calorieking  com,etc    Gradually increase physical activity to a goal of 5 days per week for 30 minutes of MODERATE intensity PLUS 2 days per week of FULL BODY resistance training    [employer] program recommended  Calorie goal:  1600 calories a day    Return visit:  2-3 months       Total time spent reviewing chart, interviewing patient, examining patient, discussing plan, answering all questions, and documentin min        ______________________________________________________________________    Subjective:   Chief Complaint   Patient presents with   • Follow-up     MWM 2mth f/u; waist 35 5in     Patient here to discuss weight associated problems and nutrition goals  HPI: Naomi Garcia  is a 40 y o  female with history of excess weight  Completed healthy core  Weight loss plan:  Conservative Program   She is considering VLCD she last met with Emily Sebastian on 2023 but an upcoming  marathon  Stress fracture put this on hold  Weight training since then  He is lost an additional 5 pounds since then  Most recent notes and records were reviewed  Wt Readings from Last 10 Encounters:   23 108 kg (237 lb 6 4 oz)   03/15/23 109 kg (241 lb)   23 110 kg (242 lb)   23 110 kg (242 lb)   22 112 kg (246 lb)   10/25/22 111 kg (245 lb 9 4 oz)   10/14/22 112 kg (246 lb 8 oz)   22 113 kg (249 lb)   22 115 kg (254 lb 6 4 oz)   22 115 kg (254 lb 6 4 oz)     Initial weight: 262  Last OV weight: 242  Current weight: 237  Change in weight: -5    Review Of Systems:  Review of Systems   Constitutional: Negative for activity change, appetite change, fatigue and fever  Respiratory: Negative for cough and shortness of breath  Cardiovascular: Negative for chest pain, palpitations and leg swelling  Gastrointestinal: Negative for abdominal pain, constipation, diarrhea, nausea and vomiting     Endocrine: "Negative for cold intolerance and heat intolerance  Genitourinary: Negative for difficulty urinating and dysuria  Musculoskeletal: Negative for arthralgias, back pain and gait problem  Skin: Negative for pallor and rash  Neurological: Negative for headaches  Psychiatric/Behavioral: Negative for dysphoric mood, sleep disturbance and suicidal ideas (or HI)  The patient is not nervous/anxious  Objective:  /60   Pulse 64   Resp 16   Ht 5' 8\" (1 727 m)   Wt 108 kg (237 lb 6 4 oz)   BMI 36 10 kg/m²   Physical Exam  Constitutional:       General: She is not in acute distress  Appearance: Normal appearance  Pulmonary:      Comments: Breathing comfortably  Speaking in full sentences  Neurological:      Mental Status: She is alert  Psychiatric:         Mood and Affect: Mood normal        Labs from 6/11/2022 reviewed  Recent labs and imaging have been personally reviewed    Lab Results   Component Value Date    WBC 7 02 06/11/2022    HGB 13 5 06/11/2022    HCT 41 9 06/11/2022    MCV 92 06/11/2022     06/11/2022     Lab Results   Component Value Date    SODIUM 139 06/11/2022    K 4 2 06/11/2022     06/11/2022    CO2 30 06/11/2022    AGAP 2 (L) 06/11/2022    BUN 14 06/11/2022    CREATININE 0 85 06/11/2022    GLUF 93 06/11/2022    CALCIUM 10 0 06/11/2022    AST 13 06/11/2022    ALT 17 06/11/2022    ALKPHOS 104 06/11/2022    TP 7 4 06/11/2022    TBILI 0 55 06/11/2022    EGFR 88 06/11/2022     Lab Results   Component Value Date    HGBA1C 5 0 06/11/2022     Lab Results   Component Value Date    BFK8QSAEKRYO 1 540 06/11/2022       "

## 2023-04-03 NOTE — PATIENT INSTRUCTIONS
- Weight not at goal  - Patient is interested in Conservative Program  - Labs reviewed: As below  General Recommendations:  Nutrition:  Eat breakfast daily  Do not skip meals  Food log (ie ) www myfitnesspal com, sparkpeople  com, loseit com, calorieking  com, etc     Practice mindful eating  Be sure to set aside time to eat, eat slowly, and savor your food  Hydration: At least 64oz of water daily  No sugar sweetened beverages  No juice (eat the fruit instead)  Exercise:  Studies have shown that the ideal exercise goal is somewhere between 150 to 300 minutes of moderate intensity exercise a week  Start with exercising 10 minutes every other day and gradually increase physical activity with a goal of at least 150 minutes of moderate intensity exercise a week, divided over at least 3 days a week  An example of this would be exercising 30 minutes a day, 5 days a week  Resistance training can increase muscle mass and increase our resting metabolic rate  FULL BODY resistance training is recommended 2-3 times a week  Do not do this on consecutive days to allow for muscle recovery  Aim for a bare minimum 5000 steps, even on days you do not exercise  Monitoring:   Weigh yourself daily  If this causes undue stress, then just weigh yourself once a week  Weigh yourself the same time of the day with the same amount of clothing on  Preferably this should be done after waking up, before you eat, and with no clothing or minimal clothing on  Specific Goals:  Food log (ie ) www myfitnesspal com,sparkpeople  com,loseit com,calorieking  com,etc    Gradually increase physical activity to a goal of 5 days per week for 30 minutes of MODERATE intensity PLUS 2 days per week of FULL BODY resistance training    Calorie goal:  1600 calories a day    Return visit:  2-3 months

## 2023-04-18 PROBLEM — E66.9 OBESITY, CLASS II, BMI 35-39.9: Status: RESOLVED | Noted: 2023-01-16 | Resolved: 2023-04-18

## 2023-04-18 PROBLEM — E66.812 OBESITY, CLASS II, BMI 35-39.9: Status: RESOLVED | Noted: 2023-01-16 | Resolved: 2023-04-18

## 2023-04-26 ENCOUNTER — OFFICE VISIT (OUTPATIENT)
Dept: PODIATRY | Facility: CLINIC | Age: 38
End: 2023-04-26

## 2023-04-26 VITALS — HEIGHT: 68 IN | BODY MASS INDEX: 35.77 KG/M2 | WEIGHT: 236 LBS

## 2023-04-26 DIAGNOSIS — M79.671 RIGHT FOOT PAIN: ICD-10-CM

## 2023-04-26 DIAGNOSIS — M84.374A STRESS REACTION OF RIGHT FOOT, INITIAL ENCOUNTER: Primary | ICD-10-CM

## 2023-04-26 NOTE — PROGRESS NOTES
"Assessment/Plan:    No problem-specific Assessment & Plan notes found for this encounter  Diagnoses and all orders for this visit:    Stress reaction of right foot, initial encounter    Right foot pain          -Advised her to follow-up with her PCP for her vitamin D work-up  - Continue rigid shoes advised to purchase custom orthotics and directed where to go  - I advised to have her resume activity on a slow but steady basis  - return to clinic as needed for continued care if she does experience any abnormalities or worsening pain    Subjective:      Patient ID: Teresa Head is a 40 y o  female  Patient transfer evaluation management of her right foot, she is having no pain, she has not gotten her vitamin D panel and was wearing rigid shoes instead of her boot but states that she feels much better she is questionable return to activity  The following portions of the patient's history were reviewed and updated as appropriate: allergies, current medications, past family history, past medical history, past social history, past surgical history and problem list     Review of Systems   Constitutional: Negative for chills and fever  HENT: Negative for ear pain and sore throat  Eyes: Negative for pain and visual disturbance  Respiratory: Negative for cough and shortness of breath  Cardiovascular: Negative for chest pain and palpitations  Gastrointestinal: Negative for abdominal pain and vomiting  Genitourinary: Negative for dysuria and hematuria  Musculoskeletal: Negative for arthralgias and back pain  Skin: Negative for color change and rash  Neurological: Negative for seizures and syncope  All other systems reviewed and are negative  Objective:      Ht 5' 8\" (1 727 m)   Wt 107 kg (236 lb)   BMI 35 88 kg/m²          Physical Exam  Constitutional:       Appearance: Normal appearance  HENT:      Head: Normocephalic and atraumatic     Cardiovascular:      Pulses: Normal " pulses  Pulmonary:      Effort: Pulmonary effort is normal    Abdominal:      General: Abdomen is flat  Musculoskeletal:         General: No swelling, tenderness or signs of injury  Skin:     General: Skin is warm  Capillary Refill: Capillary refill takes less than 2 seconds  Neurological:      General: No focal deficit present  Mental Status: She is alert

## 2023-05-01 NOTE — PROGRESS NOTES
Assessment/Plan:   Karen Lea is a 40 y  o female who is here for No chief complaint on file  On exam found to have Sebaceous Cyst of the : right medial knee  Plan: Excise lesion(s) under anesthesia in the operating room    Positioning: supine    Post Op Pain Management:   Norco    - Patient has been instructed to avoid herbs or non-directed vitamins the week prior to surgery to ensure no drug interactions with perioperative surgical and anesthetic medications  - Patient should continue beta-blocker medication up through and including the day of surgery but hold any other hypertensive medications, including diuretics, unless instructed by PCP or anesthesia  - Patient should continue his statin medication up through and including the day of surgery   - Hold metformin , If on this medication, the morning of surgery and do not resume until 48 hours AFTER surgery to avoid risk of lactic acidosis  Do not resume if eGFR is < 30  - Insulin Management:If on Insulin, patient advised to call PCP for explicit instructions  In general, will need to take one-half normal dose am of surgery but pt advised to consult PCP before making any changes  - Patient has been instructed to avoid aspirin containing medications or non-steroidal anti-inflammatory drugs for SEVEN days preceding surgery  Preoperative Clearance: None          _______________________________________________________  CC:No chief complaint on file  Gurpreet Ruiz HPI:  Karen Lea is a 40 y  o female who was referred for evaluation of No chief complaint on file       Currently patient reports a growth to her right medial knee that has been present for years  She notes it has not changed  She did have it biopsied in the past and noted that it was nothing abnormal  She was offered to have it removed, however she was breastfeeding at the time and decided to hold off  Now patient would like to have it removed       2019: aspirated  Comment:  The overall findings are suggestive of an epidermal inclusion   cyst  However the limited sampling precludes a definite diagnosis  Complete excision is recommended for further classification of the   lesion  MRI: 2019  IMPRESSION:  Corresponding to the complex lesion seen on the prior ultrasound, there is a 1 9 x 1 8 x 1 7 cm complex lesion centered in the subcutaneous fat of the medial aspect of the knee  This remains suspicious and further characterization with contrast-enhanced   MRI is recommended  ROS:  General ROS: negative  negative for - chills, fatigue, fever or night sweats, weight loss  Respiratory ROS: no cough, shortness of breath, or wheezing  Cardiovascular ROS: no chest pain or dyspnea on exertion  Genito-Urinary ROS: no dysuria, trouble voiding, or hematuria  Musculoskeletal ROS: negative for - gait disturbance, joint pain or muscle pain  Neurological ROS: no TIA or stroke symptoms  Skin ROS: See HPI  GI ROS: see HPI  Skin ROS: no new rashes or lesions   Lymphatic ROS: no new adenopathy noted by pt  Psy ROS: no new mental or behavioral disturbances       Patient Active Problem List   Diagnosis    Positive GBS test    Anxiety    Neoplasm of uncertain behavior of connective and other soft tissue    Right foot pain    Stress reaction of right foot    BMI 36 0-36 9,adult         Allergies:  Patient has no known allergies        Current Outpatient Medications:     Cholecalciferol (VITAMIN D-3 PO), Take by mouth, Disp: , Rfl:     clotrimazole-betamethasone (LOTRISONE) 1-0 05 % cream, Apply topically 2 (two) times a day, Disp: 30 g, Rfl: 1    Past Medical History:   Diagnosis Date    Anxiety     Depression        Past Surgical History:   Procedure Laterality Date    OOPHORECTOMY         Family History   Problem Relation Age of Onset    Cancer Mother         lung cancer, smoker    Heart attack Father         in 2018    Alcohol abuse Neg Hx     Substance Abuse Neg Hx     Mental illness Neg Hx         reports that she has never smoked  She has never used smokeless tobacco  She reports current alcohol use  She reports that she does not use drugs  Vitals:    05/02/23 1433   BP: 109/74   Pulse: 66   Resp: 16   Temp: (!) 96 6 °F (35 9 °C)        PHYSICAL EXAM  General Appearance:    Alert, cooperative, no distress,    Head:    Normocephalic without obvious abnormality   Eyes:    PERRL, conjunctiva/corneas clear     Neck:   Supple, no adenopathy, no JVD   Back:     Symmetric, no spinal or CVA tenderness   Lungs:     Clear to auscultation bilaterally, no wheezing or rhonchi   Heart:    Regular rate and rhythm, S1 and S2 normal, no murmur   Abdomen:        Extremities:   Extremities normal  No clubbing, cyanosis or edema   Psych:   Normal Affect, AOx3  Neurologic:  Skin:   CNII-XII intact  Strength symmetric, speech intact    Warm, dry, intact, no visible rashes or lesions except as follows: 3 cm firm mobile mass on the medial aspect of the right knee   NON-tender not fluctuant                  Piyush Cárdenas    Date: 5/2/2023 Time: 2:54 PM

## 2023-05-02 ENCOUNTER — CONSULT (OUTPATIENT)
Dept: SURGERY | Facility: CLINIC | Age: 38
End: 2023-05-02

## 2023-05-02 VITALS
TEMPERATURE: 96.6 F | SYSTOLIC BLOOD PRESSURE: 109 MMHG | HEIGHT: 68 IN | RESPIRATION RATE: 16 BRPM | WEIGHT: 234.8 LBS | BODY MASS INDEX: 35.58 KG/M2 | DIASTOLIC BLOOD PRESSURE: 74 MMHG | HEART RATE: 66 BPM

## 2023-05-02 DIAGNOSIS — D17.20 LIPOMA OF LOWER LEG: ICD-10-CM

## 2023-05-02 RX ORDER — SODIUM CHLORIDE, SODIUM LACTATE, POTASSIUM CHLORIDE, CALCIUM CHLORIDE 600; 310; 30; 20 MG/100ML; MG/100ML; MG/100ML; MG/100ML
125 INJECTION, SOLUTION INTRAVENOUS CONTINUOUS
OUTPATIENT
Start: 2023-06-12

## 2023-07-03 ENCOUNTER — TELEPHONE (OUTPATIENT)
Dept: FAMILY MEDICINE CLINIC | Facility: CLINIC | Age: 38
End: 2023-07-03

## 2023-07-03 NOTE — TELEPHONE ENCOUNTER
Pt relayed that she spoke to [facility] KRAFT at her last appt about possibly going back on 100 mg zoloft. She feels she needs to, and right away. She uses [facility].

## 2023-07-06 ENCOUNTER — OFFICE VISIT (OUTPATIENT)
Dept: FAMILY MEDICINE CLINIC | Facility: CLINIC | Age: 38
End: 2023-07-06
Payer: COMMERCIAL

## 2023-07-06 VITALS
DIASTOLIC BLOOD PRESSURE: 80 MMHG | SYSTOLIC BLOOD PRESSURE: 120 MMHG | BODY MASS INDEX: 36.37 KG/M2 | HEIGHT: 68 IN | HEART RATE: 66 BPM | OXYGEN SATURATION: 98 % | WEIGHT: 240 LBS | TEMPERATURE: 98.6 F | RESPIRATION RATE: 16 BRPM

## 2023-07-06 DIAGNOSIS — F32.A ANXIETY AND DEPRESSION: Primary | ICD-10-CM

## 2023-07-06 DIAGNOSIS — F41.9 ANXIETY AND DEPRESSION: Primary | ICD-10-CM

## 2023-07-06 PROCEDURE — 99214 OFFICE O/P EST MOD 30 MIN: CPT | Performed by: NURSE PRACTITIONER

## 2023-07-06 NOTE — PROGRESS NOTES
Assessment/Plan:     Diagnoses and all orders for this visit:    Anxiety and depression  -     sertraline (ZOLOFT) 50 mg tablet; Take 1 tablet (50 mg total) by mouth daily      Sertraline prescribed. Medication and s/e reviewed. Pt was previously on 100 mg last year so she will take 50 mg daily to start and may increase to 100 mg in 1 week if she is tolerating the medication without side effects. Pt will provide update over the next month with how she is feeling. Patient is encouraged to call our office for any questions/concerns, persistent or worsening symptoms. Patient states they understand and agree with treatment plan. Pt to f/u PRN. Subjective:      Patient ID: Yvonne Alvarado is a 40 y.o. female. Pt here today for anxiety and depression. She was on Zoloft last year and had taken herself off of it. She has had several stressful and challenging events occur this spring and is looking to restart the Sertraline. Denies SI, HI or thoughts of self harm. The following portions of the patient's history were reviewed and updated as appropriate: allergies, current medications, past family history, past medical history, past social history, past surgical history and problem list.    Review of Systems    As noted per HPI. Objective:      /80   Pulse 66   Temp 98.6 °F (37 °C) (Oral)   Resp 16   Ht 5' 8" (1.727 m)   Wt 109 kg (240 lb)   SpO2 98%   BMI 36.49 kg/m²         Depression Screening and Follow-up Plan: Patient's depression screening was positive with a PHQ-2 score of 6. Their PHQ-9 score was 16. Patient assessed for underlying major depression. Brief counseling provided and recommend additional follow-up/re-evaluation next office visit. Physical Exam  Vitals reviewed. Constitutional:       Appearance: Normal appearance. Pulmonary:      Effort: Pulmonary effort is normal.   Neurological:      Mental Status: She is alert and oriented to person, place, and time.  Mental status is at baseline. Psychiatric:         Mood and Affect: Mood normal.         Behavior: Behavior normal.         Thought Content:  Thought content normal.         Judgment: Judgment normal.

## 2023-07-31 ENCOUNTER — OFFICE VISIT (OUTPATIENT)
Dept: BARIATRICS | Facility: CLINIC | Age: 38
End: 2023-07-31
Payer: COMMERCIAL

## 2023-07-31 VITALS
BODY MASS INDEX: 36.92 KG/M2 | HEART RATE: 74 BPM | WEIGHT: 243.6 LBS | SYSTOLIC BLOOD PRESSURE: 115 MMHG | HEIGHT: 68 IN | DIASTOLIC BLOOD PRESSURE: 75 MMHG | RESPIRATION RATE: 17 BRPM

## 2023-07-31 DIAGNOSIS — F32.9 MDD (MAJOR DEPRESSIVE DISORDER): ICD-10-CM

## 2023-07-31 DIAGNOSIS — F41.9 ANXIETY: ICD-10-CM

## 2023-07-31 DIAGNOSIS — E66.9 OBESITY, CLASS II, BMI 35-39.9: Primary | ICD-10-CM

## 2023-07-31 PROCEDURE — 99214 OFFICE O/P EST MOD 30 MIN: CPT | Performed by: INTERNAL MEDICINE

## 2023-07-31 RX ORDER — BUPROPION HYDROCHLORIDE 150 MG/1
150 TABLET ORAL DAILY
Qty: 30 TABLET | Refills: 1 | Status: SHIPPED | OUTPATIENT
Start: 2023-07-31

## 2023-07-31 NOTE — ASSESSMENT & PLAN NOTE
I have messaged the patient's PCP to update her that she is being started on Wellbutrin 150 mg daily and that sertraline is being reduced to 50 mg daily. She will follow-up with her in 1 month. She has been advised to resume Noom and her exercise regimen. She will follow-up with me in 3 months. Patient instructions:  START wellbutrin 150mg daily. Reduce sertraline to 50mg daily. Follow-up with primary care provider in one month. Resume Noom. Resume exercise. Follow-up in 3 months.

## 2023-07-31 NOTE — PATIENT INSTRUCTIONS
START wellbutrin 150mg daily. Reduce sertraline to 50mg daily. Follow-up with primary care provider in one month. Resume Noom. Resume exercise. Follow-up in 3 months.

## 2023-07-31 NOTE — PROGRESS NOTES
Assessment/Plan:  Hans Varela was seen today for follow-up. Diagnoses and all orders for this visit:    Obesity, Class II, BMI 35-39.9  -     buPROPion (Wellbutrin XL) 150 mg 24 hr tablet; Take 1 tablet (150 mg total) by mouth daily    MDD (major depressive disorder)    Anxiety       Obesity, Class II, BMI 35-39.9  I have messaged the patient's PCP to update her that she is being started on Wellbutrin 150 mg daily and that sertraline is being reduced to 50 mg daily. She will follow-up with her in 1 month. She has been advised to resume Noom and her exercise regimen. She will follow-up with me in 3 months. Patient instructions:  START wellbutrin 150mg daily. Reduce sertraline to 50mg daily. Follow-up with primary care provider in one month. Resume Noom. Resume exercise. Follow-up in 3 months. Total time spent reviewing chart, interviewing patient, examining patient, discussing plan, answering all questions, and documentin min.       ______________________________________________________________________    Subjective:   Chief Complaint   Patient presents with   • Follow-up     MWM 3mf/u; [facility]     Patient here to discuss weight associated problems and nutrition goals  HPI: Josiah Saleh  is a 40 y.o. female with history of excess weight. Weight loss plan:  Conservative Program.   Most recent notes and records were reviewed. Wt Readings from Last 10 Encounters:   23 110 kg (243 lb 9.6 oz)   23 109 kg (240 lb)   23 107 kg (234 lb 12.8 oz)   23 107 kg (236 lb)   23 107 kg (236 lb 12.8 oz)   23 108 kg (237 lb 6.4 oz)   03/15/23 109 kg (241 lb)   23 110 kg (242 lb)   23 110 kg (242 lb)   22 112 kg (246 lb)     Initial weight: 262  Last OV weight: 237  Current weight: 243  Change in weight: +6lbs    Early May found out she was pregnant and had a miscarriage. Did not do [facility]gae[facility] program due to scheduling difficulties.     Restarted zoloft in July as she was very down the preceding several weeks. Currently taking 10mg daily. Does not plan to get pregnant again. Hunger/Cravings:  Hungry. Emotional eating. Dining out:  2-3 times a week  Hydration: Water <64oz a day  Alcohol:  No  Exercise: No.  Plans to start work out videos again. Sleep: >6  Weight Monitoring: No regularly    Review Of Systems:  Review of Systems   Constitutional: Negative for activity change, appetite change, fatigue and fever. Respiratory: Negative for cough and shortness of breath. Cardiovascular: Negative for chest pain, palpitations and leg swelling. Gastrointestinal: Negative for abdominal pain, constipation, diarrhea, nausea and vomiting. Endocrine: Negative for cold intolerance and heat intolerance. Genitourinary: Negative for difficulty urinating and dysuria. Musculoskeletal: Negative for arthralgias, back pain and gait problem. Skin: Negative for pallor and rash. Neurological: Negative for headaches. Psychiatric/Behavioral: Positive for dysphoric mood. Negative for sleep disturbance and suicidal ideas (or HI). The patient is not nervous/anxious. Objective:  /75   Pulse 74   Resp 17   Ht 5' 8" (1.727 m)   Wt 110 kg (243 lb 9.6 oz)   BMI 37.04 kg/m²   Physical Exam  Vitals and nursing note reviewed. Constitutional:       General: She is not in acute distress. Appearance: Normal appearance. She is not ill-appearing or diaphoretic. Eyes:      General: No scleral icterus. Cardiovascular:      Rate and Rhythm: Normal rate and regular rhythm. Pulses: Normal pulses. Heart sounds: No murmur heard. Pulmonary:      Effort: Pulmonary effort is normal. No respiratory distress. Breath sounds: Normal breath sounds. No wheezing or rhonchi. Musculoskeletal:      Cervical back: Neck supple. Right lower leg: No edema. Left lower leg: No edema. Lymphadenopathy:      Cervical: No cervical adenopathy.    Skin: Capillary Refill: Capillary refill takes less than 2 seconds. Findings: No lesion or rash. Neurological:      Mental Status: She is alert and oriented to person, place, and time. Gait: Gait normal.   Psychiatric:         Mood and Affect: Mood normal.         Behavior: Behavior normal.       Labs and Imaging  Recent labs and imaging have been personally reviewed.   Lab Results   Component Value Date    WBC 7.02 06/11/2022    HGB 13.5 06/11/2022    HCT 41.9 06/11/2022    MCV 92 06/11/2022     06/11/2022     Lab Results   Component Value Date    SODIUM 139 06/11/2022    K 4.2 06/11/2022     06/11/2022    CO2 30 06/11/2022    AGAP 2 (L) 06/11/2022    BUN 14 06/11/2022    CREATININE 0.85 06/11/2022    GLUF 93 06/11/2022    CALCIUM 10.0 06/11/2022    AST 13 06/11/2022    ALT 17 06/11/2022    ALKPHOS 104 06/11/2022    TP 7.4 06/11/2022    TBILI 0.55 06/11/2022    EGFR 88 06/11/2022     Lab Results   Component Value Date    HGBA1C 5.0 06/11/2022     Lab Results   Component Value Date    JVF8KAFRRMFV 1.540 06/11/2022

## 2023-10-15 DIAGNOSIS — F41.9 ANXIETY AND DEPRESSION: ICD-10-CM

## 2023-10-15 DIAGNOSIS — F32.A ANXIETY AND DEPRESSION: ICD-10-CM

## 2023-10-23 ENCOUNTER — TELEPHONE (OUTPATIENT)
Dept: BARIATRICS | Facility: CLINIC | Age: 38
End: 2023-10-23

## 2024-03-27 ENCOUNTER — OFFICE VISIT (OUTPATIENT)
Dept: FAMILY MEDICINE CLINIC | Facility: CLINIC | Age: 39
End: 2024-03-27
Payer: COMMERCIAL

## 2024-03-27 VITALS
WEIGHT: 258 LBS | RESPIRATION RATE: 16 BRPM | SYSTOLIC BLOOD PRESSURE: 118 MMHG | HEIGHT: 68 IN | TEMPERATURE: 97.3 F | BODY MASS INDEX: 39.1 KG/M2 | OXYGEN SATURATION: 98 % | HEART RATE: 67 BPM | DIASTOLIC BLOOD PRESSURE: 84 MMHG

## 2024-03-27 DIAGNOSIS — F41.9 ANXIETY AND DEPRESSION: Primary | ICD-10-CM

## 2024-03-27 DIAGNOSIS — F32.A ANXIETY AND DEPRESSION: Primary | ICD-10-CM

## 2024-03-27 DIAGNOSIS — L73.2 HIDRADENITIS SUPPURATIVA OF MULTIPLE SITES: ICD-10-CM

## 2024-03-27 DIAGNOSIS — D17.20 LIPOMA OF LOWER LEG: ICD-10-CM

## 2024-03-27 DIAGNOSIS — E66.9 OBESITY, CLASS II, BMI 35-39.9: ICD-10-CM

## 2024-03-27 PROCEDURE — 99214 OFFICE O/P EST MOD 30 MIN: CPT | Performed by: NURSE PRACTITIONER

## 2024-03-27 RX ORDER — BUPROPION HYDROCHLORIDE 150 MG/1
150 TABLET ORAL DAILY
Qty: 90 TABLET | Refills: 1 | Status: SHIPPED | OUTPATIENT
Start: 2024-03-27

## 2024-03-27 NOTE — PROGRESS NOTES
Assessment/Plan:     Diagnoses and all orders for this visit:    Anxiety and depression  -     buPROPion (Wellbutrin XL) 150 mg 24 hr tablet; Take 1 tablet (150 mg total) by mouth daily    Wellbutrin prescribed. Medication and s/e reviewed. Pt will also complete her labs that were ordered last April. We will have her RTO in 6 weeks for anxiety/depression/med recheck or sooner PRN. Patient is encouraged to call our office for any questions/concerns, persistent or worsening symptoms. Patient states they understand and agree with treatment plan.     Obesity, Class II, BMI 35-39.9  -     buPROPion (Wellbutrin XL) 150 mg 24 hr tablet; Take 1 tablet (150 mg total) by mouth daily    Pt encouraged to continue to follow w/ Bariatrics. Wellbutrin may have benefit w/ her weight as well.    Hidradenitis suppurativa of multiple sites  -     Ambulatory Referral to Dermatology; Future    Refer to Derm for evaluation and mgmt.    Lipoma of lower leg      Pt encouraged to reach out to general surgery to possibly reschedule procedure.        Pt to RTO in 6 weeks for recheck of her anxiety/depression and annual physical or sooner PRN.  Labs to be done prior to this appointment.    Subjective:      Patient ID: Yen Segal is a 38 y.o. female.    Pt here today for concerns of increasing anxiety and depression over the last several months.  She has been on Zoloft in the past and did not feel like there was a huge difference in some of her symptoms.  She was also under care of Bariatrics who had recommended Wellbutrin to help w/ her weight but also some benefit w/ her anxiety and depression.  It was ordered in the past for her, but she never started it.  Pt is open to starting the medication.  Pt also believes she has hidradenitis suppurativa.  She has had areas to her axilla and groin that have become inflamed in the past and responded w/ abx.  She does not have any areas of concern currently.  Pt also notes that she had a right knee  "lipoma that was supposed to be removed last year, however was cancelleed d/t a miscarriage.  Pt is open to having the surgery now.        The following portions of the patient's history were reviewed and updated as appropriate: allergies, current medications, past family history, past medical history, past social history, past surgical history, and problem list.    Review of Systems    As noted per HPI.    Objective:      /84   Pulse 67   Temp (!) 97.3 °F (36.3 °C)   Resp 16   Ht 5' 8\" (1.727 m)   Wt 117 kg (258 lb)   SpO2 98%   BMI 39.23 kg/m²          Physical Exam  Vitals reviewed.   Constitutional:       General: She is not in acute distress.     Appearance: Normal appearance. She is not ill-appearing.   HENT:      Head: Normocephalic.   Cardiovascular:      Rate and Rhythm: Normal rate and regular rhythm.      Pulses: Normal pulses.      Heart sounds: Normal heart sounds.   Pulmonary:      Effort: Pulmonary effort is normal.      Breath sounds: Normal breath sounds.   Neurological:      Mental Status: She is alert and oriented to person, place, and time. Mental status is at baseline.   Psychiatric:         Mood and Affect: Mood normal.         Behavior: Behavior normal.           "

## 2024-05-03 ENCOUNTER — TELEPHONE (OUTPATIENT)
Age: 39
End: 2024-05-03

## 2024-05-03 NOTE — TELEPHONE ENCOUNTER
Pt called for a NP appt. Offered first available March. Pt declined appt and will call around for something sooner somewhere else.

## 2024-05-15 ENCOUNTER — TELEPHONE (OUTPATIENT)
Age: 39
End: 2024-05-15

## 2024-05-15 DIAGNOSIS — E66.9 OBESITY, CLASS II, BMI 35-39.9: ICD-10-CM

## 2024-05-15 DIAGNOSIS — F41.9 ANXIETY AND DEPRESSION: Primary | ICD-10-CM

## 2024-05-15 DIAGNOSIS — F32.A ANXIETY AND DEPRESSION: Primary | ICD-10-CM

## 2024-05-15 DIAGNOSIS — E55.9 VITAMIN D DEFICIENCY: ICD-10-CM

## 2024-05-15 NOTE — TELEPHONE ENCOUNTER
Patient would like to know if any labs are needed prior to her visit. Please advise patient. If so if a copy can be sent since she goes to [facility]. Thank you .

## 2024-05-24 ENCOUNTER — OFFICE VISIT (OUTPATIENT)
Dept: FAMILY MEDICINE CLINIC | Facility: CLINIC | Age: 39
End: 2024-05-24
Payer: COMMERCIAL

## 2024-05-24 VITALS
SYSTOLIC BLOOD PRESSURE: 116 MMHG | WEIGHT: 256 LBS | RESPIRATION RATE: 16 BRPM | TEMPERATURE: 97.7 F | HEART RATE: 78 BPM | HEIGHT: 68 IN | BODY MASS INDEX: 38.8 KG/M2 | DIASTOLIC BLOOD PRESSURE: 80 MMHG

## 2024-05-24 DIAGNOSIS — F32.A ANXIETY AND DEPRESSION: ICD-10-CM

## 2024-05-24 DIAGNOSIS — Z00.00 ANNUAL PHYSICAL EXAM: Primary | ICD-10-CM

## 2024-05-24 DIAGNOSIS — F41.9 ANXIETY AND DEPRESSION: ICD-10-CM

## 2024-05-24 PROCEDURE — 99213 OFFICE O/P EST LOW 20 MIN: CPT | Performed by: NURSE PRACTITIONER

## 2024-05-24 PROCEDURE — 99395 PREV VISIT EST AGE 18-39: CPT | Performed by: NURSE PRACTITIONER

## 2024-05-24 NOTE — PROGRESS NOTES
Adult Annual Physical  Name: Yen Ashby Segal      : 1985      MRN: [id number]  Encounter Provider: LIBORIO Polanco  Encounter Date: 2024   Encounter department: [facility]    Pt will find out from GYN office if she had her TDAP in the last 10 years.  Fasting labs UTD.  PAP through GYN.  F/u PRN.    Assessment & Plan   1. Annual physical exam    2. Anxiety and depression    Pt not noting an improvement in her depression, but does feel more motivated to complete tasks. She denies any active SI or thoughts of self harm. She is not sure if she would like to continue her Wellbutrin. She is interested in therapy and will reach out to her insurance to find out what offices may take her insurance. Additionally, we discussed several other options for her treatment of anxiety/depression including Prozac, Cymbalta as well as Effexor. Pt will think about this and reach out once she decides how she would like to proceed in the future. Patient is encouraged to call our office for any questions/concerns, persistent or worsening symptoms. Patient states they understand and agree with treatment plan.     Immunizations and preventive care screenings were discussed with patient today. Appropriate education was printed on patient's after visit summary.    Counseling:  Alcohol/drug use: discussed moderation in alcohol intake, the recommendations for healthy alcohol use, and avoidance of illicit drug use.  Dental Health: discussed importance of regular tooth brushing, flossing, and dental visits.  Injury prevention: discussed safety/seat belts, safety helmets, smoke detectors, carbon dioxide detectors, and smoking near bedding or upholstery.  Sexual health: discussed sexually transmitted diseases, partner selection, use of condoms, avoidance of unintended pregnancy, and contraceptive alternatives.  Exercise: the importance of regular exercise/physical activity was  "discussed. Recommend exercise 3-5 times per week for at least 30 minutes.       Depression Screening and Follow-up Plan: Patient was screened for depression during today's encounter. They screened negative with a PHQ-2 score of 1.        History of Present Illness     Adult Annual Physical:  Patient presents for annual physical.     Diet and Physical Activity:  - Diet/Nutrition: poor diet and consuming 3-5 servings of fruits/vegetables daily.  - Exercise: walking and 30-60 minutes on average.    Depression Screening:  - PHQ-2 Score: 1    General Health:  - Sleep: sleeps well and 7-8 hours of sleep on average.  - Hearing: normal hearing right ear and normal hearing left ear.  - Vision: no vision problems and wears glasses.  - Dental: regular dental visits and brushes teeth twice daily.    /GYN Health:  - Follows with GYN: yes.   - Menopause: premenopausal.   - Last menstrual cycle: 5/3/2024.   - Contraception:. none      Review of Systems   Constitutional: Negative.  Negative for chills and fatigue.   HENT: Negative.     Respiratory: Negative.  Negative for cough and shortness of breath.    Cardiovascular: Negative.  Negative for chest pain.   Gastrointestinal: Negative.    Genitourinary: Negative.    Musculoskeletal: Negative.  Negative for myalgias.   Neurological: Negative.          Objective     /80   Pulse 78   Temp 97.7 °F (36.5 °C) (Temporal)   Resp 16   Ht 5' 8\" (1.727 m)   Wt 116 kg (256 lb)   BMI 38.92 kg/m²     Physical Exam  Vitals and nursing note reviewed.   Constitutional:       General: She is not in acute distress.     Appearance: Normal appearance. She is well-developed. She is not ill-appearing.   HENT:      Head: Normocephalic and atraumatic.      Right Ear: Tympanic membrane, ear canal and external ear normal.      Left Ear: Tympanic membrane, ear canal and external ear normal.   Eyes:      Conjunctiva/sclera: Conjunctivae normal.   Neck:      Vascular: No carotid bruit. "   Cardiovascular:      Rate and Rhythm: Normal rate and regular rhythm.      Pulses: Normal pulses.      Heart sounds: Normal heart sounds. No murmur heard.  Pulmonary:      Effort: Pulmonary effort is normal. No respiratory distress.      Breath sounds: Normal breath sounds. No wheezing.   Abdominal:      General: There is no distension.      Palpations: Abdomen is soft. There is no mass.      Tenderness: There is no abdominal tenderness. There is no guarding or rebound.      Hernia: No hernia is present.   Musculoskeletal:         General: Normal range of motion.      Cervical back: Normal range of motion and neck supple.      Right lower leg: No edema.      Left lower leg: No edema.   Skin:     General: Skin is warm and dry.      Capillary Refill: Capillary refill takes less than 2 seconds.   Neurological:      Mental Status: She is alert and oriented to person, place, and time. Mental status is at baseline.      Motor: No weakness.      Gait: Gait normal.   Psychiatric:         Mood and Affect: Mood is anxious and depressed.         Behavior: Behavior normal.         Thought Content: Thought content normal.         Judgment: Judgment normal.

## 2025-05-06 ENCOUNTER — TELEPHONE (OUTPATIENT)
Age: 40
End: 2025-05-06

## 2025-05-06 ENCOUNTER — OFFICE VISIT (OUTPATIENT)
Dept: URGENT CARE | Facility: CLINIC | Age: 40
End: 2025-05-06
Payer: COMMERCIAL

## 2025-05-06 VITALS
BODY MASS INDEX: 39.68 KG/M2 | HEART RATE: 91 BPM | RESPIRATION RATE: 18 BRPM | DIASTOLIC BLOOD PRESSURE: 64 MMHG | SYSTOLIC BLOOD PRESSURE: 112 MMHG | WEIGHT: 261 LBS | TEMPERATURE: 97.8 F | OXYGEN SATURATION: 98 %

## 2025-05-06 DIAGNOSIS — R51.9 ACUTE NONINTRACTABLE HEADACHE, UNSPECIFIED HEADACHE TYPE: Primary | ICD-10-CM

## 2025-05-06 DIAGNOSIS — R53.83 FATIGUE, UNSPECIFIED TYPE: ICD-10-CM

## 2025-05-06 DIAGNOSIS — M79.10 MYALGIA: ICD-10-CM

## 2025-05-06 PROCEDURE — G0382 LEV 3 HOSP TYPE B ED VISIT: HCPCS

## 2025-05-06 NOTE — TELEPHONE ENCOUNTER
Patient was seen in urgent care today and advised to follow up with PCP for further testing. Please review urgent care note and advise if patient needs appt in office or if labs can be ordered.

## 2025-05-06 NOTE — PATIENT INSTRUCTIONS
Contact your PCP for further evaluation and testing.  Rest, adequate fluid and nutrition intake.  Over-the-counter pain medication as directed on packaging as needed for pain (ex: Tylenol, ibuprofen).    Follow up with PCP in 3-5 days.  Proceed to  ER if symptoms worsen.    If tests are performed, our office will contact you with results only if changes need to made to the care plan discussed with you at the visit. You can review your full results on [facility].    Patient Education     Fatigue Discharge Instructions   About this topic   Fatigue is a strong feeling of being tired and weak. This often happens after doing activities that are very hard to do. Then, you don't have much energy to do other things. Just as your body can be fatigued, your mind can as well. You might have trouble being able to think clearly about things. Some people have little desire to do anything. Fatigue is normal when you have had physical and mental activity. Stress can also cause fatigue. Other causes of fatigue can be the flu or other health problems, drugs, or sleep problems.  Fatigue can also be a sign of a more serious problem. Some of these are:  Low red blood cells  Anxiety or worrying too much  Low mood  Always being in pain  Problems with your thyroid, liver, or kidneys  Drug or alcohol use  Health problems like cancer, arthritis, and heart or lung disease  Most of the time, fatigue will get better after a few days of rest.     What care is needed at home?   Ask your doctor what you need to do when you go home. Make sure you ask questions if you do not understand what the doctor says. This way you will know what to do.  Try to get at least 8 hours of sleep at night. Taking a short nap or resting during the day may help. But this can also make it hard to get a good night's sleep. If you take several naps during the day, you may want to not take as many to see if you sleep better at night.  Try doing some light exercise  each day. This may help give you more energy. You may want to avoid activities that need a lot of energy. Try to find the right amount for you.  Ask your doctor or dietitian for foods that will help you get back your strength and energy. Don't eat foods that have a lot of sugar.  Try relaxation and breathing exercises. Things like yoga, imagery, and music therapy can also help ease stress.  What follow-up care is needed?   Your doctor may ask you to make visits to the office to check on your progress. Be sure to keep these visits.  You may have some tests to help the doctor find causes for your fatigue.  If your fatigue is due to mental stress, your doctor may want you to see a psychologist or counselor to help you with your worries and anxiety.  What drugs may be needed?   The doctor may order drugs to:  Give extra vitamins and minerals  Treat the problem that causes the fatigue  Will physical activity be limited?   Physical activities may be limited until you are feeling better. Go back to your normal activities a little bit at a time.  What problems could happen?   Body's normal defenses or immune system are lowered  Not able to do the things you often do  Problems with sex  Not feeling hungry  Not being able to act as fast or do things as well. This could cause accidents when driving, at work, or at home.  Headaches, feeling angry, and not able to think clearly about things. These could cause you to not make good decisions.  Trouble with your memory or concentration  Having problems walking and exercising  Falling asleep during the day  Having problems seeing or seeing things that are not really there  Feeling like not doing things  When do I need to call the doctor?   Get help right away if you have any sudden changes with your body such as weakness, numbness, pain, or breathing problems.  You faint or pass out.  You are too weak to walk and do daily activities.  You are not feeling better in 2 to 3 days or you  are feeling worse  Teach Back: Helping You Understand   The Teach Back Method helps you understand the information we are giving you. After you talk with the staff, tell them in your own words what you learned. This helps to make sure the staff has described each thing clearly. It also helps to explain things that may have been confusing. Before going home, make sure you can do these:  I can tell you about my condition.  I can tell you ways to help my fatigue.  I can tell you what I will do if I have weakness, numbness, pain, or trouble breathing.  Last Reviewed Date   2021-02-24  Consumer Information Use and Disclaimer   This generalized information is a limited summary of diagnosis, treatment, and/or medication information. It is not meant to be comprehensive and should be used as a tool to help the user understand and/or assess potential diagnostic and treatment options. It does NOT include all information about conditions, treatments, medications, side effects, or risks that may apply to a specific patient. It is not intended to be medical advice or a substitute for the medical advice, diagnosis, or treatment of a health care provider based on the health care provider's examination and assessment of a patient’s specific and unique circumstances. Patients must speak with a health care provider for complete information about their health, medical questions, and treatment options, including any risks or benefits regarding use of medications. This information does not endorse any treatments or medications as safe, effective, or approved for treating a specific patient. UpToDate, Inc. and its affiliates disclaim any warranty or liability relating to this information or the use thereof. The use of this information is governed by the Terms of Use, available at https://www.woltersLa Koketauwer.com/en/know/clinical-effectiveness-terms   Copyright   Copyright © 2024 UpToDate, Inc. and its affiliates and/or licensors. All rights  "reserved.    Patient Education     Headaches in adults   The Basics   Written by the doctors and editors at [facility], [place]   Are there different types of headaches? -- Yes. There are different types of headaches. The most common types are:   Tension headaches - These cause pressure or tightness on both sides of the head.   Migraine headaches - Migraine is a condition that involves a headache as well as other symptoms. Migraine headaches, or \"attacks,\" often start mild and then get worse. They often affect just 1 side of the head. The pain often feels like it is pounding or throbbing. Routine activities like walking or climbing stairs can make the headache worse. Migraine can also cause nausea or vomiting, or make you sensitive to light and sound.   Cluster headaches - These affect 1 side of the head. They start quickly, happen frequently, and are very painful. They also cause symptoms on the same side of the face where the headache is. For example, you might get a droopy or watery eye, a stuffy nose, or facial sweating on 1 side.   Secondary headaches - Sometimes, a headache is related to another health problem. Doctors call this \"secondary headache.\" For example, infections, illnesses, or medicines can cause a headache. Your doctor or nurse will help figure out if your headache is related to another condition.  What can I do to feel better? -- Some people feel better if they:   Take non-prescription pain medicines - Check with your doctor first if you have a health condition or already take prescription medicines.   Rest in a cool, dark, quiet room - This works best for migraine attacks. Some people find it helps to put a cold compress on their head or neck.  Should I see a doctor or nurse? -- Call for an ambulance (in the [place] and [place], call 9-1-1) if:   Your headache starts suddenly, quickly becomes severe, or could be described as \"the worst headache of your life.\"   You also have a seizure, personality changes, or " "confusion, or you pass out.   You have weakness, numbness, trouble speaking, or trouble seeing. (Migraine can sometimes cause these symptoms, but you should be seen right away the first time that these symptoms happen.)  See a doctor or nurse if:   You get frequent or severe headaches.   Your headache began after you exercised or had a minor injury.   You have new headaches, especially if you are pregnant or older than 50.   You have a fever or stiff neck with your headache.  Depending on your symptoms, your doctor or nurse might want to do tests. This can help them figure out if your headache might be related to another health problem.  What might be causing my headaches? -- Some people find that their headaches are triggered by certain foods or things they do. To get an idea of what might be causing your headaches, you can keep a \"headache diary.\" This involves writing down every time you have a headache and what you ate and did before it started.  Some common headache triggers include:   Stress   Skipping meals or eating too little   Having too little or too much caffeine   Sleeping too much or too little   Drinking alcohol   Certain foods or drinks   Not drinking enough water  You can also write down what medicine you took for the headache and whether or not it helped.  What can I do to keep from getting headaches? -- If you know what things trigger your headache, avoid those things if possible. For example, it might help to:   Change your eating or sleeping patterns.   Learn relaxation techniques and healthy ways to manage stress.   Make healthy lifestyle changes, like quitting smoking and getting more physical activity.  If your headaches are frequent, severe, or long-lasting, your doctor can suggest ways to try to prevent them. In some cases, medicines can also help.  How are headaches treated? -- There are lots of medicines that can ease the pain of headaches. You can try taking acetaminophen (sample brand " name: Tylenol), ibuprofen (sample brand names: Advil, Motrin), or naproxen (sample brand name: Aleve). There are prescription medicines that can help with pain, too.  The right treatment for you will depend on what type of headaches you get, how often you get them, and how bad they are. Some medicines are used to treat headaches, and others are taken every day to try to prevent headaches.  If you get headaches often, work with your doctor to find a treatment that helps. Do not try to manage frequent headaches on your own with non-prescription pain medicines. Taking these too often can actually cause more headaches later.  All topics are updated as new evidence becomes available and our peer review process is complete.  This topic retrieved from [employer] on: Mar 24, 2024.  Topic 59220 Version 10.0  Release: 32.2.4 - C32.82  © 2024 UpToDate, Inc. and/or its affiliates. All rights reserved.  Consumer Information Use and Disclaimer   Disclaimer: This generalized information is a limited summary of diagnosis, treatment, and/or medication information. It is not meant to be comprehensive and should be used as a tool to help the user understand and/or assess potential diagnostic and treatment options. It does NOT include all information about conditions, treatments, medications, side effects, or risks that may apply to a specific patient. It is not intended to be medical advice or a substitute for the medical advice, diagnosis, or treatment of a health care provider based on the health care provider's examination and assessment of a patient's specific and unique circumstances. Patients must speak with a health care provider for complete information about their health, medical questions, and treatment options, including any risks or benefits regarding use of medications. This information does not endorse any treatments or medications as safe, effective, or approved for treating a specific patient. UpToDate, Inc. and its  affiliates disclaim any warranty or liability relating to this information or the use thereof.The use of this information is governed by the Terms of Use, available at https://www.wolterskluwer.com/en/know/clinical-effectiveness-terms. 2024© [employer] and its affiliates and/or licensors. All rights reserved.  Copyright   © 2024 [employer] and/or its affiliates. All rights reserved.

## 2025-05-06 NOTE — PROGRESS NOTES
"  [facility]        NAME: Yen Segal is a 39 y.o. female  : 1985    MRN: [id number]  DATE: May 6, 2025  TIME: 4:54 PM    Assessment and Plan   Acute nonintractable headache, unspecified headache type [R51.9]  1. Acute nonintractable headache, unspecified headache type        2. Myalgia        3. Fatigue, unspecified type          Discussed with patient that it is recommended she follow up with PCP for further evaluation and testing to determine cause of symptoms.  Advised symptomatic treatment in the meantime.    Patient Instructions     Contact your PCP for further evaluation and testing.  Rest, adequate fluid and nutrition intake.  Over-the-counter pain medication as directed on packaging as needed for pain (ex: Tylenol, ibuprofen).    Follow up with PCP in 3-5 days.  Proceed to  ER if symptoms worsen.    If tests are performed, our office will contact you with results only if changes need to made to the care plan discussed with you at the visit. You can review your full results on [facility].    Chief Complaint     Chief Complaint   Patient presents with    Headache     Patient reports chills, HA, body aches and not sleeping well for 3 days. Concerned that maybe it is lyme. No ticks on her.  Taking Ibuprofen and it helps the HA         History of Present Illness       Patient presents for evaluation of headache, muscle aches, chills.  Notes symptoms started 2 days ago with headache, body feeling sore.  Last night she had chills and more body aches.  She has not been sleeping well at night, feels very tired.  The headache is on both sides of her head.  She would rate it as 5/10 when present, describes it as squeezing.  When asked about dizziness or lightheadedness, patient said yes.  When asked to describe the symptoms, stated she feels \"overwhelmed with tiredness, things get hot and cold.\"  Denies known sick contacts.  Notes concern for Lyme disease.  Denies known tick or insect " bite, rash to body.  Admits to time outside recently.  Denies history of Lyme disease.  Notes she has been taking ibuprofen, which helps to relieve the symptoms for a few hours.  States she has otherwise been healthy recently.        Review of Systems   Review of Systems   Constitutional:  Positive for appetite change (decreased), chills, fatigue and fever (Tmax in ear today 100.8 F, but on forehead in the 99 F range).   HENT:  Positive for rhinorrhea (slight, possibly due to allergies). Negative for congestion, ear discharge, ear pain, postnasal drip, sinus pressure, sinus pain and sore throat.    Eyes:  Positive for itching (allergies). Negative for pain, discharge and redness.   Respiratory:  Negative for cough, chest tightness, shortness of breath, wheezing and stridor.    Cardiovascular:  Negative for chest pain.   Gastrointestinal:  Negative for abdominal pain, diarrhea, nausea and vomiting.   Musculoskeletal:  Positive for myalgias.   Skin:  Negative for rash and wound.   Neurological:  Positive for dizziness, light-headedness and headaches.         Current Medications       Current Outpatient Medications:     buPROPion (Wellbutrin XL) 150 mg 24 hr tablet, Take 1 tablet (150 mg total) by mouth daily (Patient not taking: Reported on 5/6/2025), Disp: 90 tablet, Rfl: 1    VITAMIN D PO, Vitamin D (Patient not taking: Reported on 5/6/2025), Disp: , Rfl:     Current Allergies     Allergies as of 05/06/2025    (No Known Allergies)            The following portions of the patient's history were reviewed and updated as appropriate: allergies, current medications, past family history, past medical history, past social history, past surgical history and problem list.     Past Medical History:   Diagnosis Date    Anxiety     Depression        Past Surgical History:   Procedure Laterality Date    OOPHORECTOMY         Family History   Problem Relation Age of Onset    Cancer Mother         lung cancer, smoker    Heart  attack Father         in 2018    Alcohol abuse Neg Hx     Substance Abuse Neg Hx     Mental illness Neg Hx          Medications have been verified.        Objective   /64   Pulse 91   Temp 97.8 °F (36.6 °C) (Tympanic)   Resp 18   Wt 118 kg (261 lb)   LMP 04/16/2025 (Exact Date)   SpO2 98%   BMI 39.68 kg/m²        Physical Exam     Physical Exam  Vitals and nursing note reviewed.   Constitutional:       General: She is not in acute distress.     Appearance: Normal appearance. She is not ill-appearing.   HENT:      Right Ear: Tympanic membrane, ear canal and external ear normal.      Left Ear: Tympanic membrane, ear canal and external ear normal.      Nose: Nose normal. No congestion or rhinorrhea.      Mouth/Throat:      Mouth: Mucous membranes are moist.      Pharynx: No oropharyngeal exudate or posterior oropharyngeal erythema.   Eyes:      General:         Right eye: No discharge.         Left eye: No discharge.      Extraocular Movements: Extraocular movements intact.   Cardiovascular:      Rate and Rhythm: Normal rate and regular rhythm.      Pulses: Normal pulses.      Heart sounds: Normal heart sounds.   Pulmonary:      Effort: Pulmonary effort is normal. No respiratory distress.      Breath sounds: Normal breath sounds. No wheezing, rhonchi or rales.   Abdominal:      General: Abdomen is flat. Bowel sounds are normal. There is no distension.      Palpations: Abdomen is soft.      Tenderness: There is no abdominal tenderness. There is no guarding.   Musculoskeletal:      Cervical back: Neck supple.   Lymphadenopathy:      Cervical: No cervical adenopathy.   Skin:     General: Skin is warm and dry.   Neurological:      Mental Status: She is alert.

## 2025-05-07 ENCOUNTER — APPOINTMENT (OUTPATIENT)
Dept: LAB | Facility: MEDICAL CENTER | Age: 40
End: 2025-05-07
Payer: COMMERCIAL

## 2025-05-07 DIAGNOSIS — R51.9 ACUTE NONINTRACTABLE HEADACHE, UNSPECIFIED HEADACHE TYPE: Primary | ICD-10-CM

## 2025-05-07 LAB — B BURGDOR IGG+IGM SER QL IA: NEGATIVE

## 2025-05-07 PROCEDURE — 36415 COLL VENOUS BLD VENIPUNCTURE: CPT | Performed by: NURSE PRACTITIONER

## 2025-05-07 PROCEDURE — 86618 LYME DISEASE ANTIBODY: CPT | Performed by: NURSE PRACTITIONER

## 2025-05-08 ENCOUNTER — RESULTS FOLLOW-UP (OUTPATIENT)
Dept: FAMILY MEDICINE CLINIC | Facility: CLINIC | Age: 40
End: 2025-05-08

## 2025-05-09 NOTE — TELEPHONE ENCOUNTER
Pt called in to schedule follow up. Pt was at [facility] ER today were she tested positive for lyme  pt is on doxycycline

## 2025-06-24 ENCOUNTER — OFFICE VISIT (OUTPATIENT)
Dept: URGENT CARE | Facility: CLINIC | Age: 40
End: 2025-06-24
Payer: COMMERCIAL

## 2025-06-24 ENCOUNTER — TELEPHONE (OUTPATIENT)
Age: 40
End: 2025-06-24

## 2025-06-24 VITALS
TEMPERATURE: 97 F | WEIGHT: 268.4 LBS | DIASTOLIC BLOOD PRESSURE: 82 MMHG | OXYGEN SATURATION: 99 % | SYSTOLIC BLOOD PRESSURE: 106 MMHG | HEIGHT: 68 IN | RESPIRATION RATE: 18 BRPM | BODY MASS INDEX: 40.68 KG/M2 | HEART RATE: 64 BPM

## 2025-06-24 DIAGNOSIS — S81.832A PUNCTURE WOUND OF LEFT LOWER LEG, INITIAL ENCOUNTER: ICD-10-CM

## 2025-06-24 DIAGNOSIS — Z23 ENCOUNTER FOR IMMUNIZATION: Primary | ICD-10-CM

## 2025-06-24 PROCEDURE — 90715 TDAP VACCINE 7 YRS/> IM: CPT

## 2025-06-24 PROCEDURE — G0382 LEV 3 HOSP TYPE B ED VISIT: HCPCS | Performed by: NURSE PRACTITIONER

## 2025-06-24 NOTE — PROGRESS NOTES
"  [facility]        NAME: Yen Segal is a 39 y.o. female  : 1985    MRN: [id number]  DATE: 2025  TIME: 12:35 PM    Assessment and Plan   Encounter for immunization [Z23]  1. Encounter for immunization  Tdap Vaccine greater than or equal to 8yo      2. Puncture wound of left lower leg, initial encounter  Tdap Vaccine greater than or equal to 8yo      Tdap given in office -   Discussed wound care -       Patient Instructions     Follow up with PCP in 3-5 days.  Proceed to  ER if symptoms worsen.    Chief Complaint     Chief Complaint   Patient presents with    Wound     Happened this morning. Quintin sewing needle stuck in the couch and punctured left leg when she sat down. Referred here from PCP for tetanus shot.          History of Present Illness   Yen Segal presents to the clinic c/o    Wound (Happened this morning. Quintin sewing needle stuck in the couch and punctured left leg when she sat down. Referred here from PCP for tetanus shot. )  States was sitting on an [employer] lounger that they had for years  Went to get up and felt a puncture in and out of lower left leg -   States it was like a quintin sewing needle.   Did wash it out -   No current pain -   Unsure last tdap -   Youngest child is about to be 5 so may have been during pregnancy but unsure and not in epic.        Review of Systems   Review of Systems   All other systems reviewed and are negative.        Current Medications     Long-Term Medications[1]    Current Allergies     Allergies as of 2025    (No Known Allergies)            The following portions of the patient's history were reviewed and updated as appropriate: allergies, current medications, past family history, past medical history, past social history, past surgical history and problem list.    Objective   /82   Pulse 64   Temp (!) 97 °F (36.1 °C) (Tympanic)   Resp 18   Ht 5' 8\" (1.727 m)   Wt 122 kg (268 lb 6.4 oz)   SpO2 99%   BMI 40.81 kg/m² "        Physical Exam     Physical Exam  Vitals and nursing note reviewed.   Constitutional:       Appearance: Normal appearance. She is well-developed.   HENT:      Head: Normocephalic and atraumatic.      Right Ear: Hearing normal.      Left Ear: Hearing normal.      Nose: Nose normal.      Mouth/Throat:      Lips: Pink.      Mouth: Mucous membranes are moist.      Pharynx: Oropharynx is clear.     Eyes:      General: Lids are normal.      Conjunctiva/sclera: Conjunctivae normal.      Pupils: Pupils are equal, round, and reactive to light.       Cardiovascular:      Rate and Rhythm: Normal rate and regular rhythm.      Pulses: Normal pulses.      Heart sounds: Normal heart sounds, S1 normal and S2 normal.   Pulmonary:      Effort: Pulmonary effort is normal.      Breath sounds: Normal breath sounds.   Abdominal:      General: Abdomen is flat. Bowel sounds are normal.      Palpations: Abdomen is soft.     Musculoskeletal:         General: Normal range of motion.      Cervical back: Full passive range of motion without pain, normal range of motion and neck supple.     Skin:     General: Skin is warm and dry.          Neurological:      General: No focal deficit present.      Mental Status: She is alert and oriented to person, place, and time.     Psychiatric:         Mood and Affect: Mood normal.         Speech: Speech normal.         Behavior: Behavior normal. Behavior is cooperative.         Thought Content: Thought content normal.         Judgment: Judgment normal.                          [1]   Long-Term Medications   Medication Sig Dispense Refill    buPROPion (Wellbutrin XL) 150 mg 24 hr tablet Take 1 tablet (150 mg total) by mouth daily (Patient not taking: Reported on 5/6/2025) 90 tablet 1

## 2025-06-24 NOTE — PATIENT INSTRUCTIONS
"Patient Education     Taking care of cuts, scrapes, and puncture wounds   The Basics   Written by the doctors and editors at [facility]   Does my cut need stitches? -- If your cut does not go all the way through the skin, it does not need stitches (figure 1). If your cut is wide, jagged, or does go all the way through the skin, you will most likely need stitches.  If you are not sure if your cut needs stitches, check with your doctor or nurse. Sometimes they will use special staples instead of stitches. Doctors can also use a special type of skin glue to close certain types of cuts.  This article is about caring for minor wounds (like small cuts and scrapes) that do not need to be closed with stitches, staples, or skin glue. If you got stitches, staples, or glue, your doctor or nurse will tell you how to care for yourself.  What if I have a puncture wound? -- A \"puncture wound\" is a type of cut that is made when a sharp object, like a nail, goes through the skin and into the tissue underneath. This type of wound can also be caused by animal or human bites. Puncture wounds are more likely than other minor wounds to get infected.  If you were bitten by an animal or human, see your doctor or nurse. Bite wounds need special care.  How do I take care of a minor wound on my own? -- To take care of your wound, follow these basic first aid guidelines:   Clean the wound - Wash it well with soap and water. If there is dirt, glass, or another object in your cut that you can't get out after you wash it, call your doctor or nurse.   Stop the bleeding - If your wound is bleeding, press a clean cloth or bandage firmly on the area for 20 minutes. You can also help slow the bleeding by holding the wound above the level of your heart, if possible. If the bleeding doesn't stop after 20 minutes, call your doctor or nurse.   Put a thin layer of antibiotic ointment on the wound   Cover the wound with a bandage or gauze. Keep the bandage " clean and dry. Change the bandage 1 to 2 times every day until your wound heals.   Do not swim or soak your wound in water until it has healed. Ask your doctor or nurse if you have any questions.   Each time you change the bandage, look at your skin to check for signs of infection. These include redness that is getting worse or spreading, swelling, or warmth in the area. You might see some thin clear or yellow fluid as the wound heals, which is normal.  Most minor wounds heal on their own within 7 to 10 days. As your wound heals, a scab will form. Do not pick at the scab or scratch the skin around it.  When should I call the doctor or nurse? -- Call the doctor or nurse if you have any signs of an infection. Signs of an infection include:   Fever   Redness, swelling, warmth, or increased pain around the wound   A bad smell coming from the wound   Pus (thick yellow, green, or gray fluid) draining from the wound   Red streaks on the skin around the wound, or red streaks going up your arm or leg  Will I need a tetanus shot? -- Maybe. It depends on how old you are and when your last tetanus shot was. Tetanus is a serious infection that can cause muscle stiffness and spasms, and even lead to death. It is caused by bacteria (germs) that live in the dirt.  Most children get a tetanus vaccine as part of their routine check-ups. Vaccines can prevent certain serious or deadly infections. Many adults also get a tetanus vaccine as part of their routine check-ups. Getting all your vaccines is important, since it's possible to get tetanus even from a small wound.  If your skin is cut or punctured, and especially if the cut is dirty or deep, ask your doctor or nurse if you need a tetanus shot.  All topics are updated as new evidence becomes available and our peer review process is complete.  This topic retrieved from [employer] on: Mar 20, 2024.  Topic 22179 Version 11.0  Release: 32.2.4 - C32.78  © 2024 [employer] and/or its  "affiliates. All rights reserved.  figure 1: Minor cuts and scrapes     Picture A shows a scrape (also called an \"abrasion\"). The scrape doesn't go all the way through the skin, so it does not need stitches. Picture B shows a cut that does go all the way through the skin. This cut is deep, so it needs stitches.  Graphic 59567 Version 4.0  Consumer Information Use and Disclaimer   Disclaimer: This generalized information is a limited summary of diagnosis, treatment, and/or medication information. It is not meant to be comprehensive and should be used as a tool to help the user understand and/or assess potential diagnostic and treatment options. It does NOT include all information about conditions, treatments, medications, side effects, or risks that may apply to a specific patient. It is not intended to be medical advice or a substitute for the medical advice, diagnosis, or treatment of a health care provider based on the health care provider's examination and assessment of a patient's specific and unique circumstances. Patients must speak with a health care provider for complete information about their health, medical questions, and treatment options, including any risks or benefits regarding use of medications. This information does not endorse any treatments or medications as safe, effective, or approved for treating a specific patient. [employer] and its affiliates disclaim any warranty or liability relating to this information or the use thereof.The use of this information is governed by the Terms of Use, available at https://www.[employer].com/en/know/clinical-effectiveness-terms. 2024© [employer] and its affiliates and/or licensors. All rights reserved.  Copyright   © 2024 [employer] and/or its affiliates. All rights reserved.    "

## 2025-06-24 NOTE — TELEPHONE ENCOUNTER
Pt called asking if she should get a tetanus shot after puncturing her leg on an rusted nail.  Pt was advised to go to Urgent Care or the ER to get at tetanus shot as she hasn't had one in 10 years.

## 2025-08-13 ENCOUNTER — TELEPHONE (OUTPATIENT)
Age: 40
End: 2025-08-13